# Patient Record
Sex: MALE | Race: WHITE | NOT HISPANIC OR LATINO | Employment: OTHER | URBAN - NONMETROPOLITAN AREA
[De-identification: names, ages, dates, MRNs, and addresses within clinical notes are randomized per-mention and may not be internally consistent; named-entity substitution may affect disease eponyms.]

---

## 2018-12-10 ENCOUNTER — OFFICE VISIT (OUTPATIENT)
Dept: CARDIOLOGY | Facility: CLINIC | Age: 63
End: 2018-12-10

## 2018-12-10 ENCOUNTER — HOSPITAL ENCOUNTER (OUTPATIENT)
Dept: CARDIOLOGY | Facility: HOSPITAL | Age: 63
Discharge: HOME | End: 2018-12-10
Admitting: PHYSICIAN ASSISTANT

## 2018-12-10 VITALS
OXYGEN SATURATION: 96 % | HEIGHT: 69 IN | WEIGHT: 280.2 LBS | DIASTOLIC BLOOD PRESSURE: 89 MMHG | SYSTOLIC BLOOD PRESSURE: 150 MMHG | BODY MASS INDEX: 41.5 KG/M2 | HEART RATE: 67 BPM

## 2018-12-10 DIAGNOSIS — R06.02 SOB (SHORTNESS OF BREATH): ICD-10-CM

## 2018-12-10 DIAGNOSIS — R06.02 SOB (SHORTNESS OF BREATH): Primary | ICD-10-CM

## 2018-12-10 DIAGNOSIS — Z01.810 PRE-OPERATIVE CARDIOVASCULAR EXAMINATION: ICD-10-CM

## 2018-12-10 DIAGNOSIS — I10 ESSENTIAL HYPERTENSION: ICD-10-CM

## 2018-12-10 PROCEDURE — 99204 OFFICE O/P NEW MOD 45 MIN: CPT | Performed by: PHYSICIAN ASSISTANT

## 2018-12-10 PROCEDURE — 93306 TTE W/DOPPLER COMPLETE: CPT | Performed by: INTERNAL MEDICINE

## 2018-12-10 PROCEDURE — 93306 TTE W/DOPPLER COMPLETE: CPT

## 2018-12-10 RX ORDER — FAMOTIDINE 20 MG/1
20 TABLET, FILM COATED ORAL NIGHTLY PRN
Refills: 5 | Status: ON HOLD | COMMUNITY
Start: 2018-10-01 | End: 2019-03-12

## 2018-12-10 RX ORDER — MELOXICAM 7.5 MG/1
TABLET ORAL
Status: ON HOLD | COMMUNITY
End: 2019-03-12

## 2018-12-10 RX ORDER — LISINOPRIL 40 MG/1
40 TABLET ORAL DAILY
Refills: 5 | COMMUNITY
Start: 2018-12-03

## 2018-12-10 RX ORDER — HYDROCODONE BITARTRATE AND ACETAMINOPHEN 7.5; 325 MG/1; MG/1
TABLET ORAL
COMMUNITY

## 2018-12-10 NOTE — PROGRESS NOTES
Felix Foss is a 63 y.o. male     Chief Complaint   Patient presents with   • Surgical Clearance     knee replacement in [facility]  The patient presents today for initial evaluation.  He presents in the preoperative setting.  He is pending knee replacement.  EKG in the preop setting apparently suggested possible ventricular hypertrophy, by patient report.  I have reviewed EKG and see no significant enlargement or findings to suggest hypertrophy.  He had sinus rhythm with no acute changes noted.  The patient's previous cardiac history includes catheterization 3-4 years ago.  He has had no cardiovascular history otherwise.  Symptomatically, the patient seems to do well.  He has dyspnea which is his baseline.  He denies chest pain.  He has no PND orthopnea.  He denies palpitations, dizziness, or syncope.  Exercise capacity is poor secondary to his knee.  He does tell me that labs historically have all been unremarkable.  He has no further complaints otherwise.  We have been asked to see the patient and consider echocardiogram in the preop setting.      Current Outpatient Medications   Medication Sig Dispense Refill   • famotidine (PEPCID) 20 MG tablet Take 20 mg by mouth At Night As Needed.  5   • HYDROcodone-acetaminophen (NORCO) 7.5-325 MG per tablet hydrocodone 7.5 mg-acetaminophen 325 mg tablet     • lisinopril (PRINIVIL,ZESTRIL) 40 MG tablet Take 40 mg by mouth Daily.  5   • meloxicam (MOBIC) 7.5 MG tablet meloxicam 7.5 mg tablet       No current facility-administered medications for this visit.        Patient has no known allergies.    Past Medical History:   Diagnosis Date   • Arthritis    • GERD (gastroesophageal reflux disease)    • Hypertension        Social History     Socioeconomic History   • Marital status:      Spouse name: Not on file   • Number of children: Not on file   • Years of education: Not on file   • Highest education level: Not on file   Social Needs   •  "Financial resource strain: Not on file   • Food insecurity - worry: Not on file   • Food insecurity - inability: Not on file   • Transportation needs - medical: Not on file   • Transportation needs - non-medical: Not on file   Occupational History   • Not on file   Tobacco Use   • Smoking status: Never Smoker   • Smokeless tobacco: Former User   Substance and Sexual Activity   • Alcohol use: No     Frequency: Never   • Drug use: No   • Sexual activity: Not on file   Other Topics Concern   • Not on file   Social History Narrative   • Not on file           Family History   Problem Relation Age of Onset   • No Known Problems Mother    • No Known Problems Father        Review of Systems   Constitutional: Positive for fatigue.   HENT: Negative.    Eyes: Positive for visual disturbance ( wears reading glasses ).   Respiratory: Negative for shortness of breath.    Cardiovascular: Positive for palpitations. Negative for chest pain and leg swelling.   Gastrointestinal: Negative for constipation and diarrhea.   Endocrine: Negative.    Genitourinary: Negative for difficulty urinating.   Musculoskeletal: Positive for arthralgias ( ) and myalgias.   Skin: Negative.    Allergic/Immunologic: Negative for environmental allergies and food allergies.   Neurological: Negative for dizziness and light-headedness.   Hematological: Does not bruise/bleed easily.   Psychiatric/Behavioral: The patient is nervous/anxious.        Objective   Vitals:    12/10/18 1322   BP: 150/89   BP Location: Left arm   Patient Position: Sitting   Pulse: 67   SpO2: 96%   Weight: 127 kg (280 lb 3.2 oz)   Height: 175.3 cm (69\")      /89 (BP Location: Left arm, Patient Position: Sitting)   Pulse 67   Ht 175.3 cm (69\")   Wt 127 kg (280 lb 3.2 oz)   SpO2 96%   BMI 41.38 kg/m²     Lab Results (most recent)     None          Physical Exam   Constitutional: He is oriented to person, place, and time. He appears well-developed and well-nourished. No " distress.   HENT:   Head: Normocephalic and atraumatic.   Eyes: Conjunctivae and EOM are normal. Pupils are equal, round, and reactive to light.   Neck: Normal range of motion. Neck supple. No JVD present. No tracheal deviation present.   Cardiovascular: Normal rate, regular rhythm, normal heart sounds and intact distal pulses.   Pulmonary/Chest: Effort normal and breath sounds normal.   Abdominal: Soft. Bowel sounds are normal. He exhibits no distension and no mass. There is no tenderness. There is no rebound and no guarding.   Musculoskeletal: Normal range of motion. He exhibits no edema, tenderness or deformity.   Neurological: He is alert and oriented to person, place, and time.   Skin: Skin is warm and dry. No rash noted. No erythema. No pallor.   Psychiatric: He has a normal mood and affect. His behavior is normal. Judgment and thought content normal.   Nursing note and vitals reviewed.      Procedure   Procedures         Assessment/Plan     Problems Addressed this Visit     None      Visit Diagnoses     SOB (shortness of breath)    -  Primary    Relevant Orders    Adult Transthoracic Echo Complete W/ Cont if Necessary Per Protocol    Pre-operative cardiovascular examination        Essential hypertension        Relevant Medications    lisinopril (PRINIVIL,ZESTRIL) 40 MG tablet            I would like to schedule an echo to evaluate LV size and function, as well as valvular morphologies in the preop setting.  I do not feel that he needs ischemia assessment preoperatively.  We will try to schedule the echo today through the clinic.  Otherwise, the patient is on appropriate medications which I will not change.  We did give him refills of lisinopril therapy.  If echocardiogram is unremarkable, we will send a letter on to his surgeon advising that he is at acceptable risk from cardiovascular standpoint for surgery.  If unremarkable, we can see him as needed to the clinic.                Patient's Body mass index is  41.38 kg/m². BMI is above normal parameters. Recommendations include: educational material.         Electronically signed by:

## 2018-12-10 NOTE — PATIENT INSTRUCTIONS
Obesity, Adult  Obesity is the condition of having too much total body fat. Being overweight or obese means that your weight is greater than what is considered healthy for your body size. Obesity is determined by a measurement called BMI. BMI is an estimate of body fat and is calculated from height and weight. For adults, a BMI of 30 or higher is considered obese.  Obesity can eventually lead to other health concerns and major illnesses, including:  · Stroke.  · Coronary artery disease (CAD).  · Type 2 diabetes.  · Some types of cancer, including cancers of the colon, breast, uterus, and gallbladder.  · Osteoarthritis.  · High blood pressure (hypertension).  · High cholesterol.  · Sleep apnea.  · Gallbladder stones.  · Infertility problems.    What are the causes?  The main cause of obesity is taking in (consuming) more calories than your body uses for energy. Other factors that contribute to this condition may include:  · Being born with genes that make you more likely to become obese.  · Having a medical condition that causes obesity. These conditions include:  ? Hypothyroidism.  ? Polycystic ovarian syndrome (PCOS).  ? Binge-eating disorder.  ? Cushing syndrome.  · Taking certain medicines, such as steroids, antidepressants, and seizure medicines.  · Not being physically active (sedentary lifestyle).  · Living where there are limited places to exercise safely or buy healthy foods.  · Not getting enough sleep.    What increases the risk?  The following factors may increase your risk of this condition:  · Having a family history of obesity.  · Being a woman of -American descent.  · Being a man of  descent.    What are the signs or symptoms?  Having excessive body fat is the main symptom of this condition.  How is this diagnosed?  This condition may be diagnosed based on:  · Your symptoms.  · Your medical history.  · A physical exam. Your health care provider may measure:  ? Your BMI. If you are an  adult with a BMI between 25 and less than 30, you are considered overweight. If you are an adult with a BMI of 30 or higher, you are considered obese.  ? The distances around your hips and your waist (circumferences). These may be compared to each other to help diagnose your condition.  ? Your skinfold thickness. Your health care provider may gently pinch a fold of your skin and measure it.    How is this treated?  Treatment for this condition often includes changing your lifestyle. Treatment may include some or all of the following:  · Dietary changes. Work with your health care provider and a dietitian to set a weight-loss goal that is healthy and reasonable for you. Dietary changes may include eating:  ? Smaller portions. A portion size is the amount of a particular food that is healthy for you to eat at one time. This varies from person to person.  ? Low-calorie or low-fat options.  ? More whole grains, fruits, and vegetables.  · Regular physical activity. This may include aerobic activity (cardio) and strength training.  · Medicine to help you lose weight. Your health care provider may prescribe medicine if you are unable to lose 1 pound a week after 6 weeks of eating more healthily and doing more physical activity.  · Surgery. Surgical options may include gastric banding and gastric bypass. Surgery may be done if:  ? Other treatments have not helped to improve your condition.  ? You have a BMI of 40 or higher.  ? You have life-threatening health problems related to obesity.    Follow these instructions at home:    Eating and drinking    · Follow recommendations from your health care provider about what you eat and drink. Your health care provider may advise you to:  ? Limit fast foods, sweets, and processed snack foods.  ? Choose low-fat options, such as low-fat milk instead of whole milk.  ? Eat 5 or more servings of fruits or vegetables every day.  ? Eat at home more often. This gives you more control over  what you eat.  ? Choose healthy foods when you eat out.  ? Learn what a healthy portion size is.  ? Keep low-fat snacks on hand.  ? Avoid sugary drinks, such as soda, fruit juice, iced tea sweetened with sugar, and flavored milk.  ? Eat a healthy breakfast.  · Drink enough water to keep your urine clear or pale yellow.  · Do not go without eating for long periods of time (do not fast) or follow a fad diet. Fasting and fad diets can be unhealthy and even dangerous.  Physical Activity  · Exercise regularly, as told by your health care provider. Ask your health care provider what types of exercise are safe for you and how often you should exercise.  · Warm up and stretch before being active.  · Cool down and stretch after being active.  · Rest between periods of activity.  Lifestyle  · Limit the time that you spend in front of your TV, computer, or video game system.  · Find ways to reward yourself that do not involve food.  · Limit alcohol intake to no more than 1 drink a day for nonpregnant women and 2 drinks a day for men. One drink equals 12 oz of beer, 5 oz of wine, or 1½ oz of hard liquor.  General instructions  · Keep a weight loss journal to keep track of the food you eat and how much you exercise you get.  · Take over-the-counter and prescription medicines only as told by your health care provider.  · Take vitamins and supplements only as told by your health care provider.  · Consider joining a support group. Your health care provider may be able to recommend a support group.  · Keep all follow-up visits as told by your health care provider. This is important.  Contact a health care provider if:  · You are unable to meet your weight loss goal after 6 weeks of dietary and lifestyle changes.  This information is not intended to replace advice given to you by your health care provider. Make sure you discuss any questions you have with your health care provider.  Document Released: 01/25/2006 Document Revised:  05/22/2017 Document Reviewed: 10/05/2016  [employer] Interactive Patient Education © 2018 Elsevier Inc.  MyPlate from [employer]  The general, healthful diet is based on the 2010 Dietary Guidelines for Americans. The amount of food you need to eat from each food group depends on your age, sex, and level of physical activity and can be individualized by a dietitian. Go to ChooseMyPlate.gov for more information.  What do I need to know about the MyPlate plan?  · Enjoy your food, but eat less.  · Avoid oversized portions.  ? ½ of your plate should include fruits and vegetables.  ? ¼ of your plate should be grains.  ? ¼ of your plate should be protein.  Grains  · Make at least half of your grains whole grains.  · For a 2,000 calorie daily food plan, eat 6 oz every day.  · 1 oz is about 1 slice bread, 1 cup cereal, or ½ cup cooked rice, cereal, or pasta.  Vegetables  · Make half your plate fruits and vegetables.  · For a 2,000 calorie daily food plan, eat 2½ cups every day.  · 1 cup is about 1 cup raw or cooked vegetables or vegetable juice or 2 cups raw leafy greens.  Fruits  · Make half your plate fruits and vegetables.  · For a 2,000 calorie daily food plan, eat 2 cups every day.  · 1 cup is about 1 cup fruit or 100% fruit juice or ½ cup dried fruit.  Protein  · For a 2,000 calorie daily food plan, eat 5½ oz every day.  · 1 oz is about 1 oz meat, poultry, or fish, ¼ cup cooked beans, 1 egg, 1 Tbsp peanut butter, or ½ oz nuts or seeds.  Dairy  · Switch to fat-free or low-fat (1%) milk.  · For a 2,000 calorie daily food plan, eat 3 cups every day.  · 1 cup is about 1 cup milk or yogurt or soy milk (soy beverage), 1½ oz natural cheese, or 2 oz processed cheese.  Fats, Oils, and Empty Calories  · Only small amounts of oils are recommended.  · Empty calories are calories from solid fats or added sugars.  · Compare sodium in foods like soup, bread, and frozen meals. Choose the foods with lower numbers.  · Drink water instead of  sugary drinks.  What foods can I eat?  Grains  Whole grains such as whole wheat, quinoa, millet, and bulgur. Bread, rolls, and pasta made from whole grains. Brown or wild rice. Hot or cold cereals made from whole grains and without added sugar.  Vegetables  All fresh vegetables, especially fresh red, dark green, or orange vegetables. Peas and beans. Low-sodium frozen or canned vegetables prepared without added salt. Low-sodium vegetable juices.  Fruits  All fresh, frozen, and dried fruits. Canned fruit packed in water or fruit juice without added sugar. Fruit juices without added sugar.  Meats and Other Protein Sources  Boiled, baked, or grilled lean meat trimmed of fat. Skinless poultry. Fresh seafood and shellfish. Canned seafood packed in water. Unsalted nuts and unsalted nut butters. Tofu. Dried beans and pea. Eggs.  Dairy  Low-fat or fat-free milk, yogurt, and cheeses.  Sweets and Desserts  Frozen desserts made from low-fat milk.  Fats and Oils  Olive, peanut, and canola oils and margarine. Salad dressing and mayonnaise made from these oils.  Other  Soups and casseroles made from allowed ingredients and without added fat or salt.  The items listed above may not be a complete list of recommended foods or beverages. Contact your dietitian for more options.  What foods are not recommended?  Grains  Sweetened, low-fiber cereals. Packaged baked goods. Snack crackers and chips. Cheese crackers, butter crackers, and biscuits. Frozen waffles, sweet breads, doughnuts, pastries, packaged baking mixes, pancakes, cakes, and cookies.  Vegetables  Regular canned or frozen vegetables or vegetables prepared with salt. Canned tomatoes. Canned tomato sauce. Fried vegetables. Vegetables in cream sauce or cheese sauce.  Fruits  Fruits packed in syrup or made with added sugar.  Meats and Other Protein Sources  Marbled or fatty meats such as ribs. Poultry with skin. Fried meats, poultry, eggs, or fish. Sausages, hot dogs, and deli  meats such as pastrami, bologna, or salami.  Dairy  Whole milk, cream, cheeses made from whole milk, sour cream. Ice cream or yogurt made from whole milk or with added sugar.  Beverages  For adults, no more than one alcoholic drink per day. Regular soft drinks or other sugary beverages. Juice drinks.  Sweets and Desserts  Sugary or fatty desserts, candy, and other sweets.  Fats and Oils  Solid shortening or partially hydrogenated oils. Solid margarine. Margarine that contains trans fats. Butter.  The items listed above may not be a complete list of foods and beverages to avoid. Contact your dietitian for more information.  This information is not intended to replace advice given to you by your health care provider. Make sure you discuss any questions you have with your health care provider.  Document Released: 01/06/2009 Document Revised: 05/25/2017 Document Reviewed: 11/26/2014  [employer] Interactive Patient Education © 2018 Elsevier Inc.

## 2018-12-11 ENCOUNTER — TELEPHONE (OUTPATIENT)
Dept: CARDIOLOGY | Facility: CLINIC | Age: 63
End: 2018-12-11

## 2018-12-11 NOTE — TELEPHONE ENCOUNTER
----- Message from Maru Ortiz MA sent at 12/10/2018  1:28 PM EST -----  Regarding: Cardiac Clearance   Cardiac clearance for knee replacement with Dr. Joseph Strange. Number is [id number]

## 2018-12-11 NOTE — TELEPHONE ENCOUNTER
12/10/2018: called @ 0544 PM, office closed . Need cardiac clearance req faxed -;[facility]  12/11/2018: called @ 0358 PM and left VM for Bridget requesting her to fax cardiac clearance req . -;[facility]

## 2018-12-12 LAB
BH CV ECHO MEAS - ACS: 2.6 CM
BH CV ECHO MEAS - AI DEC SLOPE: 179.4 CM/SEC^2
BH CV ECHO MEAS - AI DEC TIME: 1.9 SEC
BH CV ECHO MEAS - AI MAX PG: 46.9 MMHG
BH CV ECHO MEAS - AI MAX VEL: 342.5 CM/SEC
BH CV ECHO MEAS - AI P1/2T: 559.1 MSEC
BH CV ECHO MEAS - AO MAX PG: 6.4 MMHG
BH CV ECHO MEAS - AO MEAN PG: 3.6 MMHG
BH CV ECHO MEAS - AO ROOT AREA (BSA CORRECTED): 1.7
BH CV ECHO MEAS - AO ROOT AREA: 13 CM^2
BH CV ECHO MEAS - AO ROOT DIAM: 4.1 CM
BH CV ECHO MEAS - AO V2 MAX: 126.3 CM/SEC
BH CV ECHO MEAS - AO V2 MEAN: 87.4 CM/SEC
BH CV ECHO MEAS - AO V2 VTI: 29.9 CM
BH CV ECHO MEAS - BSA(HAYCOCK): 2.5 M^2
BH CV ECHO MEAS - BSA: 2.4 M^2
BH CV ECHO MEAS - BZI_BMI: 41.3 KILOGRAMS/M^2
BH CV ECHO MEAS - BZI_METRIC_HEIGHT: 175.3 CM
BH CV ECHO MEAS - BZI_METRIC_WEIGHT: 127 KG
BH CV ECHO MEAS - EDV(CUBED): 178.9 ML
BH CV ECHO MEAS - EDV(TEICH): 155.9 ML
BH CV ECHO MEAS - EF(CUBED): 71.1 %
BH CV ECHO MEAS - EF(TEICH): 62.1 %
BH CV ECHO MEAS - ESV(CUBED): 51.8 ML
BH CV ECHO MEAS - ESV(TEICH): 59.2 ML
BH CV ECHO MEAS - FS: 33.9 %
BH CV ECHO MEAS - IVS/LVPW: 1.3
BH CV ECHO MEAS - IVSD: 1.4 CM
BH CV ECHO MEAS - LA DIMENSION: 4.9 CM
BH CV ECHO MEAS - LA/AO: 1.2
BH CV ECHO MEAS - LV IVRT: 0.17 SEC
BH CV ECHO MEAS - LV MASS(C)D: 298.2 GRAMS
BH CV ECHO MEAS - LV MASS(C)DI: 125.1 GRAMS/M^2
BH CV ECHO MEAS - LVIDD: 5.6 CM
BH CV ECHO MEAS - LVIDS: 3.7 CM
BH CV ECHO MEAS - LVPWD: 1.1 CM
BH CV ECHO MEAS - MITRAL HR: 92.7 BPM
BH CV ECHO MEAS - MITRAL R-R: 0.65 SEC
BH CV ECHO MEAS - MV A MAX VEL: 79 CM/SEC
BH CV ECHO MEAS - MV DEC SLOPE: 286.7 CM/SEC^2
BH CV ECHO MEAS - MV DEC TIME: 0.23 SEC
BH CV ECHO MEAS - MV E MAX VEL: 65.6 CM/SEC
BH CV ECHO MEAS - MV E/A: 0.83
BH CV ECHO MEAS - MV MAX PG: 2.7 MMHG
BH CV ECHO MEAS - MV MEAN PG: 1 MMHG
BH CV ECHO MEAS - MV V2 MAX: 82.1 CM/SEC
BH CV ECHO MEAS - MV V2 MEAN: 46.2 CM/SEC
BH CV ECHO MEAS - MV V2 VTI: 29.9 CM
BH CV ECHO MEAS - PA MAX PG: 5 MMHG
BH CV ECHO MEAS - PA MEAN PG: 2.6 MMHG
BH CV ECHO MEAS - PA V2 MAX: 111.3 CM/SEC
BH CV ECHO MEAS - PA V2 MEAN: 75.3 CM/SEC
BH CV ECHO MEAS - PA V2 VTI: 23.2 CM
BH CV ECHO MEAS - PI END-D VEL: 89.9 CM/SEC
BH CV ECHO MEAS - PULM. HR: 194.8 BPM
BH CV ECHO MEAS - PULM. R-R: 0.31 SEC
BH CV ECHO MEAS - RAP SYSTOLE: 10 MMHG
BH CV ECHO MEAS - RVDD: 3.4 CM
BH CV ECHO MEAS - RVSP: 26.2 MMHG
BH CV ECHO MEAS - SI(AO): 163.4 ML/M^2
BH CV ECHO MEAS - SI(CUBED): 53.3 ML/M^2
BH CV ECHO MEAS - SI(TEICH): 40.6 ML/M^2
BH CV ECHO MEAS - SV(AO): 389.5 ML
BH CV ECHO MEAS - SV(CUBED): 127.1 ML
BH CV ECHO MEAS - SV(TEICH): 96.7 ML
BH CV ECHO MEAS - TR MAX VEL: 201 CM/SEC
MAXIMAL PREDICTED HEART RATE: 158 BPM
STRESS TARGET HR: 134 BPM

## 2018-12-26 NOTE — TELEPHONE ENCOUNTER
Cardiac clearance req was received in office from . This was reviewed by Bryant Billings PA-C and [facility] back. [facility]

## 2019-01-29 ENCOUNTER — OFFICE VISIT (OUTPATIENT)
Dept: CARDIAC SURGERY | Facility: CLINIC | Age: 64
End: 2019-01-29

## 2019-01-29 VITALS
HEIGHT: 69 IN | HEART RATE: 74 BPM | BODY MASS INDEX: 39.69 KG/M2 | SYSTOLIC BLOOD PRESSURE: 148 MMHG | OXYGEN SATURATION: 97 % | TEMPERATURE: 98 F | WEIGHT: 268 LBS | DIASTOLIC BLOOD PRESSURE: 95 MMHG

## 2019-01-29 DIAGNOSIS — I71.21 ASCENDING AORTIC ANEURYSM (HCC): Primary | ICD-10-CM

## 2019-01-29 DIAGNOSIS — Z00.6 EXAMINATION FOR NORMAL COMPARISON FOR CLINICAL RESEARCH: Primary | ICD-10-CM

## 2019-01-29 PROBLEM — Z82.49 FAMILY HISTORY OF ANEURYSM: Status: ACTIVE | Noted: 2019-01-29

## 2019-01-29 PROCEDURE — 99203 OFFICE O/P NEW LOW 30 MIN: CPT | Performed by: THORACIC SURGERY (CARDIOTHORACIC VASCULAR SURGERY)

## 2019-01-29 RX ORDER — FERROUS SULFATE TAB EC 324 MG (65 MG FE EQUIVALENT) 324 (65 FE) MG
324 TABLET DELAYED RESPONSE ORAL 2 TIMES DAILY
Refills: 2 | Status: ON HOLD | COMMUNITY
Start: 2019-01-24 | End: 2019-03-12

## 2019-02-08 ENCOUNTER — TELEPHONE (OUTPATIENT)
Dept: GENETICS | Facility: HOSPITAL | Age: 64
End: 2019-02-08

## 2019-02-15 ENCOUNTER — TELEPHONE (OUTPATIENT)
Dept: CARDIAC SURGERY | Facility: CLINIC | Age: 64
End: 2019-02-15

## 2019-02-15 NOTE — TELEPHONE ENCOUNTER
Wife called to let us know that he went to [facility] ER last week with sharp pains between his shoulder blades, chest pain and pressure.  Says ER did EKG, CXR, and some blood work, and nothing was abnormal.  His BP was high, and primary care doctor added a new blood pressure medicine.  Advised her not sure of anything else we could do, but would pass info along to you.  Please Advise if we need to do anything differently that what's already in the plan.

## 2019-02-19 ENCOUNTER — TELEPHONE (OUTPATIENT)
Dept: GENETICS | Facility: HOSPITAL | Age: 64
End: 2019-02-19

## 2019-02-19 DIAGNOSIS — I71.21 ASCENDING AORTIC ANEURYSM (HCC): Primary | ICD-10-CM

## 2019-02-19 NOTE — TELEPHONE ENCOUNTER
Patients spouse called concerned about insurance not covering counseling portion of visit. I discussed with Kip Saravia,  of Cancer Services regarding this concern.  Kip, stated the [employer] would be able to cover the portion of the counseling piece. I called Ms. Foss and let her know that we would like for Mr. Foss to keep his genetic appt since Dr. Evans felt the need for the testing and that [facility] would assist with the uncovered portion.

## 2019-02-25 ENCOUNTER — CLINICAL SUPPORT (OUTPATIENT)
Dept: GENETICS | Facility: HOSPITAL | Age: 64
End: 2019-02-25

## 2019-02-25 DIAGNOSIS — Z82.49 FAMILY HISTORY OF AORTIC ANEURYSM: ICD-10-CM

## 2019-02-25 DIAGNOSIS — I71.9 AORTIC ANEURYSM WITHOUT RUPTURE, UNSPECIFIED PORTION OF AORTA (HCC): Primary | ICD-10-CM

## 2019-03-11 ENCOUNTER — TRANSCRIBE ORDERS (OUTPATIENT)
Dept: ADMINISTRATIVE | Facility: HOSPITAL | Age: 64
End: 2019-03-11

## 2019-03-11 DIAGNOSIS — I20.0 UNSTABLE ANGINA (HCC): Primary | ICD-10-CM

## 2019-03-12 ENCOUNTER — HOSPITAL ENCOUNTER (OUTPATIENT)
Facility: HOSPITAL | Age: 64
Setting detail: HOSPITAL OUTPATIENT SURGERY
Discharge: HOME | End: 2019-03-12
Attending: INTERNAL MEDICINE | Admitting: INTERNAL MEDICINE

## 2019-03-12 VITALS
BODY MASS INDEX: 36.38 KG/M2 | WEIGHT: 245.59 LBS | TEMPERATURE: 97.7 F | SYSTOLIC BLOOD PRESSURE: 122 MMHG | HEART RATE: 73 BPM | OXYGEN SATURATION: 95 % | DIASTOLIC BLOOD PRESSURE: 92 MMHG | RESPIRATION RATE: 18 BRPM | HEIGHT: 69 IN

## 2019-03-12 DIAGNOSIS — I20.0 UNSTABLE ANGINA (HCC): ICD-10-CM

## 2019-03-12 LAB
ANION GAP SERPL CALCULATED.3IONS-SCNC: 8 MMOL/L (ref 3–11)
BUN BLD-MCNC: 12 MG/DL (ref 9–23)
BUN/CREAT SERPL: 15.4 (ref 7–25)
CALCIUM SPEC-SCNC: 9.5 MG/DL (ref 8.7–10.4)
CHLORIDE SERPL-SCNC: 104 MMOL/L (ref 99–109)
CO2 SERPL-SCNC: 25 MMOL/L (ref 20–31)
CREAT BLD-MCNC: 0.78 MG/DL (ref 0.6–1.3)
DEPRECATED RDW RBC AUTO: 48.3 FL (ref 37–54)
ERYTHROCYTE [DISTWIDTH] IN BLOOD BY AUTOMATED COUNT: 13.5 % (ref 11.3–14.5)
GFR SERPL CREATININE-BSD FRML MDRD: 101 ML/MIN/1.73
GLUCOSE BLD-MCNC: 102 MG/DL (ref 70–100)
HCT VFR BLD AUTO: 42.5 % (ref 38.9–50.9)
HGB BLD-MCNC: 13.9 G/DL (ref 13.1–17.5)
MCH RBC QN AUTO: 31.7 PG (ref 27–31)
MCHC RBC AUTO-ENTMCNC: 32.7 G/DL (ref 32–36)
MCV RBC AUTO: 97 FL (ref 80–99)
PLATELET # BLD AUTO: 368 10*3/MM3 (ref 150–450)
PMV BLD AUTO: 10.2 FL (ref 6–12)
POTASSIUM BLD-SCNC: 3.8 MMOL/L (ref 3.5–5.5)
RBC # BLD AUTO: 4.38 10*6/MM3 (ref 4.2–5.76)
SODIUM BLD-SCNC: 137 MMOL/L (ref 132–146)
WBC NRBC COR # BLD: 9.23 10*3/MM3 (ref 3.5–10.8)

## 2019-03-12 PROCEDURE — C1887 CATHETER, GUIDING: HCPCS | Performed by: INTERNAL MEDICINE

## 2019-03-12 PROCEDURE — C1769 GUIDE WIRE: HCPCS | Performed by: INTERNAL MEDICINE

## 2019-03-12 PROCEDURE — 85027 COMPLETE CBC AUTOMATED: CPT | Performed by: INTERNAL MEDICINE

## 2019-03-12 PROCEDURE — 25010000002 HEPARIN (PORCINE) PER 1000 UNITS: Performed by: INTERNAL MEDICINE

## 2019-03-12 PROCEDURE — 80048 BASIC METABOLIC PNL TOTAL CA: CPT | Performed by: INTERNAL MEDICINE

## 2019-03-12 PROCEDURE — 93458 L HRT ARTERY/VENTRICLE ANGIO: CPT | Performed by: INTERNAL MEDICINE

## 2019-03-12 PROCEDURE — 36415 COLL VENOUS BLD VENIPUNCTURE: CPT

## 2019-03-12 PROCEDURE — 25010000002 FENTANYL CITRATE (PF) 100 MCG/2ML SOLUTION: Performed by: INTERNAL MEDICINE

## 2019-03-12 PROCEDURE — 25010000002 MIDAZOLAM PER 1 MG: Performed by: INTERNAL MEDICINE

## 2019-03-12 PROCEDURE — 25010000002 BIVALIRUDIN 5 MG/ML: Performed by: INTERNAL MEDICINE

## 2019-03-12 PROCEDURE — 0 IOPAMIDOL PER 1 ML: Performed by: INTERNAL MEDICINE

## 2019-03-12 PROCEDURE — C1874 STENT, COATED/COV W/DEL SYS: HCPCS | Performed by: INTERNAL MEDICINE

## 2019-03-12 PROCEDURE — C1894 INTRO/SHEATH, NON-LASER: HCPCS | Performed by: INTERNAL MEDICINE

## 2019-03-12 PROCEDURE — C9600 PERC DRUG-EL COR STENT SING: HCPCS | Performed by: INTERNAL MEDICINE

## 2019-03-12 DEVICE — XIENCE SIERRA™ EVEROLIMUS ELUTING CORONARY STENT SYSTEM 4.00 MM X 23 MM / RAPID-EXCHANGE
Type: IMPLANTABLE DEVICE | Status: FUNCTIONAL
Brand: XIENCE SIERRA™

## 2019-03-12 DEVICE — XIENCE SIERRA™ EVEROLIMUS ELUTING CORONARY STENT SYSTEM 4.00 MM X 15 MM / RAPID-EXCHANGE
Type: IMPLANTABLE DEVICE | Status: FUNCTIONAL
Brand: XIENCE SIERRA™

## 2019-03-12 RX ORDER — MELOXICAM 15 MG/1
15 TABLET ORAL DAILY
COMMUNITY

## 2019-03-12 RX ORDER — ACETAMINOPHEN 325 MG/1
650 TABLET ORAL EVERY 4 HOURS PRN
Status: DISCONTINUED | OUTPATIENT
Start: 2019-03-12 | End: 2019-03-12 | Stop reason: HOSPADM

## 2019-03-12 RX ORDER — MIDAZOLAM HYDROCHLORIDE 1 MG/ML
INJECTION INTRAMUSCULAR; INTRAVENOUS AS NEEDED
Status: DISCONTINUED | OUTPATIENT
Start: 2019-03-12 | End: 2019-03-12 | Stop reason: HOSPADM

## 2019-03-12 RX ORDER — TEMAZEPAM 7.5 MG/1
7.5 CAPSULE ORAL NIGHTLY PRN
Status: DISCONTINUED | OUTPATIENT
Start: 2019-03-12 | End: 2019-03-12 | Stop reason: HOSPADM

## 2019-03-12 RX ORDER — FENTANYL CITRATE 50 UG/ML
INJECTION, SOLUTION INTRAMUSCULAR; INTRAVENOUS AS NEEDED
Status: DISCONTINUED | OUTPATIENT
Start: 2019-03-12 | End: 2019-03-12 | Stop reason: HOSPADM

## 2019-03-12 RX ORDER — ASPIRIN 325 MG
325 TABLET, DELAYED RELEASE (ENTERIC COATED) ORAL DAILY
Status: DISCONTINUED | OUTPATIENT
Start: 2019-03-12 | End: 2019-03-12 | Stop reason: HOSPADM

## 2019-03-12 RX ORDER — ZOLPIDEM TARTRATE 10 MG/1
10 TABLET ORAL NIGHTLY PRN
COMMUNITY

## 2019-03-12 RX ORDER — LIDOCAINE HYDROCHLORIDE 10 MG/ML
INJECTION, SOLUTION EPIDURAL; INFILTRATION; INTRACAUDAL; PERINEURAL AS NEEDED
Status: DISCONTINUED | OUTPATIENT
Start: 2019-03-12 | End: 2019-03-12 | Stop reason: HOSPADM

## 2019-03-12 RX ORDER — ALPRAZOLAM 0.25 MG/1
0.25 TABLET ORAL 3 TIMES DAILY PRN
Status: DISCONTINUED | OUTPATIENT
Start: 2019-03-12 | End: 2019-03-12 | Stop reason: HOSPADM

## 2019-03-12 RX ORDER — NALOXONE HCL 0.4 MG/ML
0.4 VIAL (ML) INJECTION
Status: DISCONTINUED | OUTPATIENT
Start: 2019-03-12 | End: 2019-03-12 | Stop reason: HOSPADM

## 2019-03-12 RX ORDER — HYDROCODONE BITARTRATE AND ACETAMINOPHEN 5; 325 MG/1; MG/1
1 TABLET ORAL EVERY 4 HOURS PRN
Status: DISCONTINUED | OUTPATIENT
Start: 2019-03-12 | End: 2019-03-12 | Stop reason: HOSPADM

## 2019-03-12 RX ORDER — SODIUM CHLORIDE 9 MG/ML
250 INJECTION, SOLUTION INTRAVENOUS CONTINUOUS
Status: ACTIVE | OUTPATIENT
Start: 2019-03-12 | End: 2019-03-12

## 2019-03-12 RX ORDER — CLOPIDOGREL BISULFATE 75 MG/1
75 TABLET ORAL DAILY
Qty: 90 TABLET | Refills: 3 | Status: SHIPPED | OUTPATIENT
Start: 2019-03-12

## 2019-03-12 RX ORDER — PANTOPRAZOLE SODIUM 40 MG/1
40 TABLET, DELAYED RELEASE ORAL DAILY
COMMUNITY

## 2019-03-12 RX ORDER — MORPHINE SULFATE 2 MG/ML
1 INJECTION, SOLUTION INTRAMUSCULAR; INTRAVENOUS EVERY 4 HOURS PRN
Status: DISCONTINUED | OUTPATIENT
Start: 2019-03-12 | End: 2019-03-12 | Stop reason: HOSPADM

## 2019-03-12 RX ORDER — CLOPIDOGREL BISULFATE 75 MG/1
TABLET ORAL AS NEEDED
Status: DISCONTINUED | OUTPATIENT
Start: 2019-03-12 | End: 2019-03-12 | Stop reason: HOSPADM

## 2019-03-12 RX ORDER — AMLODIPINE BESYLATE 2.5 MG/1
2.5 TABLET ORAL DAILY
COMMUNITY

## 2019-03-12 RX ADMIN — ASPIRIN 325 MG: 325 TABLET, COATED ORAL at 11:00

## 2019-03-12 NOTE — H&P
Pre-Cardiac Catheterization Report  Cardiovascular Laboratory  [facility]      Patient:  Aristeo Foss  :  1955  PCP:  Danielle Lagos MD  PHONE:  [phone]    DATE: 3/12/2019    BRIEF HPI:  Aristeo Foss is a 64 y.o. male with hypertension and a family history of coronary artery disease.  He was recently diagnosed with an aneurysm and it is being followed by Dr. Evans.  He is complaining of a several month history of chest pain which he describes as pressure.  He states it is associated with shortness of breath, palpitations, and blurred vision.  He radiates to his left arm he says that his symptoms are exacerbated by exertion and relieved with rest.  He now presents for left heart catheterization with possible intervention.    Cardiac Risk Factors:  advanced age (older than 55 for men, 65 for women), family history of premature cardiovascular disease, hypertension, male gender, obesity (BMI >= 30 kg/m2)    Anginal class in last 2 weeks:  CCS class III    CHF Class in last 2 weeks:  NYHA Class II    Cardiogenic shock:  no    Cardiac arrest <24 hours:  no    Stress test within last 6 months:   no   Details:    Previous cardiac catheterization:  yes  Details:     Previous CABG:  no  Details:      Allergies:     IV contrast allergy:  no  No Known Allergies    MEDICATIONS:  Prior to Admission medications    Medication Sig Start Date End Date Taking? Authorizing Provider   Docusate Sodium (DOC-Q-LACE PO) Take 100 mg by mouth 2 (Two) Times a Day.    Franchesca Barrientos MD   famotidine (PEPCID) 20 MG tablet Take 20 mg by mouth At Night As Needed. 10/1/18   Franchesca Barrientos MD   ferrous sulfate 324 (65 Fe) MG tablet delayed-release EC tablet Take 324 mg by mouth 2 (Two) Times a Day. 19   Franchesca Barrientos MD   HYDROcodone-acetaminophen (NORCO) 7.5-325 MG per tablet hydrocodone 7.5 mg-acetaminophen 325 mg tablet    Franchesca Barrientos MD   lisinopril (PRINIVIL,ZESTRIL) 40 MG  tablet Take 40 mg by mouth Daily. 12/3/18   Provider, MD Franchesca   meloxicam (MOBIC) 7.5 MG tablet meloxicam 7.5 mg tablet    Provider, MD Franchesca       Past medical & surgical history, social and family history reviewed in the electronic medical record.    ROS:  Cardiovascular ROS: positive for - chest pain, dyspnea on exertion, palpitations and shortness of breath    Physical Exam:    Vitals: There were no vitals filed for this visit. There were no vitals filed for this visit.    General Appearance:    Alert, cooperative, in no acute distress   Head:    Normocephalic, without obvious abnormality, atraumatic   Eyes:            Lids and lashes normal, conjunctivae and sclerae normal, no   icterus, no pallor, corneas clear, PERRLA   Ears:    Ears appear intact with no abnormalities noted   Neck:   No adenopathy, supple, trachea midline, no thyromegaly, no   carotid bruit, no JVD   Back:     No kyphosis present, no scoliosis present, range of motion normal   Lungs:     Clear to auscultation,respirations regular, even and                  unlabored    Heart:    Regular rhythm and normal rate, normal S1 and S2, no            murmur, no gallop, no rub, no click   Chest Wall:    No abnormalities observed   Abdomen:     Normal bowel sounds, no masses, no organomegaly, soft        non-tender, non-distended, no guarding, no rebound                tenderness   Rectal:     Deferred   Extremities:   Moves all extremities well, no edema, no cyanosis, no             redness   Pulses:   Pulses palpable and equal bilaterally   Skin:   No bleeding, bruising or rash   Neurologic:   Cranial nerves 2 - 12 grossly intact, sensation intact     Barbaeu Test:  Left: Normal  (oxymetric Allens) Right: Not Assessed             No results found for: CHLPL, TRIG, HDL, LDLDIRECT, AST, ALT        Impression      · Unstable angina    Plan     · Procedure to perform: [facility]  · Planned access: Left radial artery              Tonio March,  PA  03/12/19  10:33 AM

## 2019-03-13 ENCOUNTER — DOCUMENTATION (OUTPATIENT)
Dept: CARDIAC REHAB | Facility: HOSPITAL | Age: 64
End: 2019-03-13

## 2019-03-13 ENCOUNTER — CALL CENTER PROGRAMS (OUTPATIENT)
Dept: CALL CENTER | Facility: HOSPITAL | Age: 64
End: 2019-03-13

## 2019-03-13 NOTE — OUTREACH NOTE
PCI Survey      Responses   Facility patient discharged from?  [facility]   Procedure date  03/12/19   PCI site:  Left, Arm   Performing MD  Other (annotate) [Dr Wade]   Attempt successful?  Yes   Call start time  1306   Call end time  1320   Person spoke with today (if not patient) and relationship  Spouse   Is the patient taking prescribed medications:  ASA, Plavix   Nursing intervention  Reminded to continue to take prescribed medications   Nursing intervention  Patient education provided, Advised patient to call cardiology office   Does the patient have an appointment scheduled with the cardiologist?  No   Did the patient feel prepared to go home on the same day as the procedure?  Yes   Is the patient satisfied with the same day discharge process?  Yes   PCI call completed  Yes          Tristan Aleman RN

## 2019-03-13 NOTE — PROGRESS NOTES
Staff reviewed chart and patient has qualifying diagnosis for Phase II Cardiac Rehab.  Staff will contact patient in regards to scheduling or referring to another facility.

## 2019-04-03 ENCOUNTER — TELEPHONE (OUTPATIENT)
Dept: CARDIAC SURGERY | Facility: CLINIC | Age: 64
End: 2019-04-03

## 2019-04-08 ENCOUNTER — DOCUMENTATION (OUTPATIENT)
Dept: GENETICS | Facility: HOSPITAL | Age: 64
End: 2019-04-08

## 2019-04-08 NOTE — PROGRESS NOTES
Aristeo Foss was seen for genetic counseling due to a personal and family history of aortic aneurysm.  Mr. Foss has an ascending aortic aneurysm that was identified at age 64.  He was interested in discussing the features of familial thoracic aortic aneurysm and dissection (TAAD) and the available genetic testing.      Given the possibility that genetic testing would identify a hereditary risk for TAAD and provide information for the family, Mr. Foss decided to pursue genetic testing of the genes associated with TAAD.  The TAADNext panel was ordered from [employer] which includes 35 genes associated with aortic aneurysm.  Genetic testing was negative for mutations in the 35 genes included on the TAADNext panel.  These normal results were discussed with Mr. Foss by telephone on 4/8/2019.    FAMILY HISTORY:   Brother: Aortic aneurysm, 60s  Sister:  Aneurysm  Sister:  Aortic Aneurysm  Father:  Aneurysm      GENETIC COUNSELING:  We discussed the family history of aortic aneurysm.  There are different types of hereditary predispositions to aortic dissection.   One type is an autosomal dominant condition called Thoracic Aortic Aneurysm and Aortic Dissections or TAAD that causes aortic aneurysms and dissections in this specific region of the aorta.  With familial TAAD, the aortic dissection and aneurysm are the only findings associated with the condition.  Several genes have been associated with TAAD, these include ACTA2, TGFBR1, TGFBR2, and MYH11.  Another way you can see an inherited risk for aortic dissection is with particular syndromes, such as Marfan syndrome and Loeys-Alfie syndrome.  These are conditions that affect connective tissue in various parts of the body, so you would expect to see other features along with the risk for aortic dissection.  No members of Mr. Foss’s family are known to have been diagnosed with either of these conditions; however these conditions do have variable expression, so some  individuals identified to have a mutation may be quite mildly affected.  Familial TAAD as well as the syndromes associated with aortic aneurysm are inherited in an autosomal dominant pattern of inheritance, meaning that if either parent carried a mutation in a gene associated with one of these conditions, each child would have a 50% chance of inheriting that mutation.  Lastly, we discussed the possibility that the aortic aneurysm is a sporadic or multifactorial event not caused by a single gene.     GENETIC TESTING: The risks, benefits and limitations of genetic testing and implications for clinical management following testing were reviewed.  DNA test results can influence decisions regarding screening, prevention and surgical management.  Genetic testing can have significant psychological implications for both individuals and families.  Also discussed was the possibility of employment and insurance discrimination based on genetic test results and the laws in place to prevent this (NIMA), as well as the limitations of these laws.    We discussed panel testing, which would involve testing for multiple genes associated with TAAD.  The benefits and limitations of genetic testing were discussed and Mr. Foss decided to pursue testing via the panel. The implications of a positive or negative test result were discussed. We discussed the possibility that, in some cases, genetic test results may be informative or may be ambiguous due to the identification of a genetic variant. These variants may or may not impact the function of the gene.  Given his personal history, a negative test result does not eliminate all risk to his relatives, although the risk would not be as high as it would with positive genetic testing.      GENETIC TESTING: Mr. Foss’s testing was negative for mutations in the 35 genes associated with TAAD or related conditions.  This negative testing lowers, but does not eliminate, the risk of a hereditary  condition contributing to the family history of aneurysm.   Genetic testing is not indicated for Mr. Foss’s children since his testing was negative. Relatives should discuss the family history with their physician to determine if they should undergo any additional screening for aneurysm.    PLAN:  Genetic counseling remains available to Mr. Foss.  He is welcome to contact us with any questions or concerns.        Elaina Hernandez MS, [facility] [place], [facility]     Licensed Certified Genetic Counselor    Cc: Aristeo Evans MD

## 2019-04-08 NOTE — PROGRESS NOTES
Aristeo Foss was seen for genetic counseling due to a personal and family history of aortic aneurysm.  Mr. Foss has an ascending aortic aneurysm that was identified at age 64.  He was interested in discussing the features of familial thoracic aortic aneurysm and dissection (TAAD) and the available genetic testing.      Given the possibility that genetic testing would identify a hereditary risk for TAAD and provide information for the family, Mr. Foss decided to pursue genetic testing of the genes associated with TAAD.  The TAADNext panel was ordered from [employer] which includes 35 genes associated with aortic aneurysm.  Results are expected in 3-4 weeks.     FAMILY HISTORY:   Brother: Aortic aneurysm, 60s  Sister:  Aneurysm  Sister:  Aortic Aneurysm  Father:  aneurysm      GENETIC COUNSELING:  We discussed the family history of aortic aneurysm.  There are different types of hereditary predispositions to aortic dissection.   One type is an autosomal dominant condition called Thoracic Aortic Aneurysm and Aortic Dissections or TAAD that causes aortic aneurysms and dissections in this specific region of the aorta.  With familial TAAD, the aortic dissection and aneurysm are the only findings associated with the condition.  Several genes have been associated with TAAD, these include ACTA2, TGFBR1, TGFBR2, and MYH11.  Another way you can see an inherited risk for aortic dissection is with particular syndromes, such as Marfan syndrome and Loeys-Alfie syndrome.  These are conditions that affect connective tissue in various parts of the body, so you would expect to see other features along with the risk for aortic dissection.  No members of Mr. Foss’s family are known to have been diagnosed with either of these conditions; however these conditions do have variable expression, so some individuals identified to have a mutation may be quite mildly affected.  Familial TAAD as well as the syndromes associated with  aortic aneurysm are inherited in an autosomal dominant pattern of inheritance, meaning that if either parent carried a mutation in a gene associated with one of these conditions, each child would have a 50% chance of inheriting that mutation.  Lastly, we discussed the possibility that the aortic aneurysm is a sporadic or multifactorial event not caused by a single gene.     GENETIC TESTING: The risks, benefits and limitations of genetic testing and implications for clinical management following testing were reviewed.  DNA test results can influence decisions regarding screening, prevention and surgical management.  Genetic testing can have significant psychological implications for both individuals and families.  Also discussed was the possibility of employment and insurance discrimination based on genetic test results and the laws in place to prevent this (NIMA), as well as the limitations of these laws.    We discussed panel testing, which would involve testing for multiple genes associated with TAAD.  The benefits and limitations of genetic testing were discussed and Mr. Foss decided to pursue testing via the panel. The implications of a positive or negative test result were discussed. We discussed the possibility that, in some cases, genetic test results may be informative or may be ambiguous due to the identification of a genetic variant. These variants may or may not impact the function of the gene.  Given her personal history, a negative test result does not eliminate all risk to her relatives, although the risk would not be as high as it would with positive genetic testing.      PLAN:  Results are expected in 3-4 weeks and the patient will be contacted by telephone to discuss.  Mr. Foss is welcome to contact us with any questions or concerns.       Elaina Hernandez MS, [facility] [place], [facility]     Licensed Certified Genetic Counselor

## 2019-04-09 ENCOUNTER — DOCUMENTATION (OUTPATIENT)
Dept: CARDIAC REHAB | Facility: HOSPITAL | Age: 64
End: 2019-04-09

## 2019-07-08 DIAGNOSIS — Z00.6 EXAMINATION FOR NORMAL COMPARISON FOR CLINICAL RESEARCH: Primary | ICD-10-CM

## 2019-07-30 ENCOUNTER — OFFICE VISIT (OUTPATIENT)
Dept: CARDIAC SURGERY | Facility: CLINIC | Age: 64
End: 2019-07-30

## 2019-07-30 VITALS
SYSTOLIC BLOOD PRESSURE: 144 MMHG | DIASTOLIC BLOOD PRESSURE: 85 MMHG | WEIGHT: 258 LBS | HEART RATE: 60 BPM | OXYGEN SATURATION: 98 % | BODY MASS INDEX: 38.21 KG/M2 | HEIGHT: 69 IN | TEMPERATURE: 97.9 F

## 2019-07-30 DIAGNOSIS — I71.21 ASCENDING AORTIC ANEURYSM (HCC): Primary | ICD-10-CM

## 2019-07-30 PROCEDURE — 99213 OFFICE O/P EST LOW 20 MIN: CPT | Performed by: PHYSICIAN ASSISTANT

## 2019-07-30 NOTE — PROGRESS NOTES
07/30/2019  Patient Information  Aristeo YU [address] [address]   1955  'PCP/Referring Physician'  Danielle Lagos MD  [phone]  No ref. provider found    Chief Complaint   Patient presents with   • Follow-up     3 month follow up to discuss CT chest results for a ascending thoracic aneurysm.   • Thoracic Aneurysm       History of Present Illness:  Patient is a 64-year-old  male with history of hypertension, dyslipidemia, GERD, coronary artery disease with recent PCI performed 3/12/2019 by Dr. Wade and ascending aortic aneurysm with family history of aneurysm who presents to office for review and discussion of recent CT scan of the chest.  The patient was last seen on 2/12/2019 and complains of some occasional chest pain with activity but denies any abdominal pain, back pain, shortness of breath or difficulty with ambulation.  He presents to office today for review and discussion of recent CT scan of the chest.    Patient Active Problem List   Diagnosis   • Ascending aortic aneurysm (CMS/HCC)   • Family history of aneurysm   • Unstable angina (CMS/HCC)     Past Medical History:   Diagnosis Date   • Arthritis    • Cataract    • Chest pain    • Dyslipidemia    • GERD (gastroesophageal reflux disease)    • Hypertension    • Inguinal hernia    • Kidney stone    • Low iron    • SOB (shortness of breath)    • Umbilical hernia    • Unstable angina (CMS/HCC)      Past Surgical History:   Procedure Laterality Date   • CARDIAC CATHETERIZATION     • CARDIAC CATHETERIZATION N/A 3/12/2019    Procedure: Left Heart Cath;  Surgeon: Curtis Wade MD;  Location: [facility] CATH INVASIVE LOCATION;  Service: Cardiology   • CATARACT EXTRACTION Right    • KNEE SURGERY Right    • UMBILICAL HERNIA REPAIR         Current Outpatient Medications:   •  amLODIPine (NORVASC) 2.5 MG tablet, Take 2.5 mg by  mouth Daily., Disp: , Rfl:   •  aspirin 81 MG tablet, Take 1 tablet by mouth Daily., Disp: 30 tablet, Rfl: 11  •  clopidogrel (PLAVIX) 75 MG tablet, Take 1 tablet by mouth Daily., Disp: 90 tablet, Rfl: 3  •  HYDROcodone-acetaminophen (NORCO) 7.5-325 MG per tablet, hydrocodone 7.5 mg-acetaminophen 325 mg tablet, Disp: , Rfl:   •  lisinopril (PRINIVIL,ZESTRIL) 40 MG tablet, Take 40 mg by mouth Daily., Disp: , Rfl: 5  •  meloxicam (MOBIC) 15 MG tablet, Take 15 mg by mouth Daily., Disp: , Rfl:   •  pantoprazole (PROTONIX) 40 MG EC tablet, Take 40 mg by mouth Daily., Disp: , Rfl:   •  zolpidem (AMBIEN) 10 MG tablet, Take 10 mg by mouth At Night As Needed for Sleep., Disp: , Rfl:   No Known Allergies  Social History     Socioeconomic History   • Marital status:      Spouse name: Not on file   • Number of children: 3   • Years of education: Not on file   • Highest education level: Not on file   Occupational History   • Occupation: disabled farmer     Comment: back problems   Tobacco Use   • Smoking status: Never Smoker   • Smokeless tobacco: Former User   Substance and Sexual Activity   • Alcohol use: No     Frequency: Never   • Drug use: No   • Sexual activity: Defer   Social History Narrative    Lives in [facility]     Family History   Problem Relation Age of Onset   • Heart failure Mother    • Coronary artery disease Father      Review of Systems   Constitution: Positive for weakness and weight gain (in the past 2 months). Negative for chills, fever, malaise/fatigue, night sweats and weight loss.   HENT: Positive for hearing loss. Negative for odynophagia and sore throat.    Eyes: Negative.    Cardiovascular: Positive for chest pain. Negative for dyspnea on exertion, leg swelling, orthopnea and palpitations.   Respiratory: Negative.  Negative for cough and hemoptysis.    Endocrine: Negative.  Negative for cold intolerance, heat intolerance, polydipsia, polyphagia and polyuria.   Hematologic/Lymphatic:  "Bruises/bleeds easily.   Skin: Negative.  Negative for itching and rash.   Musculoskeletal: Positive for back pain, joint pain and muscle cramps. Negative for joint swelling and myalgias.   Gastrointestinal: Negative.  Negative for abdominal pain, constipation, diarrhea, hematemesis, hematochezia, melena, nausea and vomiting.   Genitourinary: Negative.  Negative for dysuria, frequency and hematuria.   Neurological: Positive for dizziness and loss of balance. Negative for focal weakness, headaches, numbness and seizures.   Psychiatric/Behavioral: Negative.  Negative for suicidal ideas.   Allergic/Immunologic: Negative.    All other systems reviewed and are negative.    Vitals:    19 1307   BP: 144/85   BP Location: Right arm   Patient Position: Sitting   Pulse: 60   Temp: 97.9 °F (36.6 °C)   SpO2: 98%   Weight: 117 kg (258 lb)   Height: 175.3 cm (69\")      Physical Exam:  Gen - NAD, pleasant, cooperative  CV - Regular rate and rhythm, no murmur gallop or rub  Pulm - Lungs clear to auscultation without wheeze or rhonchi   GI - Soft, normoactive bowel sounds, non-tender  Ext - Without edema  Neuro - CN II - XII grossly intact, tongue midline, voice normal      Labs/Imagin/8/19 US-Aorta @ [facility]  1.6cm Proximal  1.7cm Mid  1.8cm Distal    19 CT Scan Chest @ [facility]  Ascending TAA measured at 5.1cm      Assessment/Plan:  Patient is a 64-year-old  male with history of hypertension, dyslipidemia, GERD, coronary artery disease with recent PCI performed 3/12/2019 by Dr. Wade and ascending aortic aneurysm with family history of aneurysm who presents to office for review and discussion of recent CT scan of the chest.  The patient has been doing well since last being seen in office.  I personally reviewed his recent CT scan of the chest, and discussed the results with the patient, answering all questions to his satisfaction.  I encouraged the patient to discuss his blood pressure " today in office, which was 144/85, with his primary care provider to discuss possible medical management modification.  I instructed the patient to refrain from overexertion and heavy lifting.  I would like for the patient to follow-up in 6 months with CT scan of the chest for continued evaluation.  If he has any questions, concerns or acutely worsening symptoms during the interval he may call our office or present to the nearest emergency department immediately.    Patient Active Problem List   Diagnosis   • Ascending aortic aneurysm (CMS/HCC)   • Family history of aneurysm   • Unstable angina (CMS/HCC)

## 2020-01-30 ENCOUNTER — TELEPHONE (OUTPATIENT)
Dept: CARDIAC SURGERY | Facility: CLINIC | Age: 65
End: 2020-01-30

## 2020-01-31 DIAGNOSIS — I71.21 ASCENDING AORTIC ANEURYSM (HCC): Primary | ICD-10-CM

## 2020-03-17 ENCOUNTER — TELEPHONE (OUTPATIENT)
Dept: CARDIAC SURGERY | Facility: CLINIC | Age: 65
End: 2020-03-17

## 2020-03-17 NOTE — TELEPHONE ENCOUNTER
Spoke with patient's wife.  R/S appt from 4/28/20 to 5/26/20 secondary to the Covid-19 precautions.

## 2020-05-26 ENCOUNTER — OFFICE VISIT (OUTPATIENT)
Dept: CARDIAC SURGERY | Facility: CLINIC | Age: 65
End: 2020-05-26

## 2020-05-26 VITALS
TEMPERATURE: 98.4 F | SYSTOLIC BLOOD PRESSURE: 153 MMHG | HEIGHT: 69 IN | BODY MASS INDEX: 39.4 KG/M2 | HEART RATE: 75 BPM | DIASTOLIC BLOOD PRESSURE: 90 MMHG | OXYGEN SATURATION: 99 % | WEIGHT: 266 LBS

## 2020-05-26 DIAGNOSIS — E66.01 MORBIDLY OBESE (HCC): ICD-10-CM

## 2020-05-26 DIAGNOSIS — I71.21 ASCENDING AORTIC ANEURYSM (HCC): Primary | ICD-10-CM

## 2020-05-26 PROCEDURE — 99213 OFFICE O/P EST LOW 20 MIN: CPT | Performed by: NURSE PRACTITIONER

## 2020-05-26 RX ORDER — TAMSULOSIN HYDROCHLORIDE 0.4 MG/1
1 CAPSULE ORAL DAILY
COMMUNITY

## 2020-05-26 RX ORDER — ROSUVASTATIN CALCIUM 20 MG/1
20 TABLET, COATED ORAL DAILY
COMMUNITY

## 2020-05-26 NOTE — PROGRESS NOTES
[facility] Cardiothoracic Surgery Follow-Up Note    Name:  Aristeo Foss  MRN Number:  [id number]  Date of Encounter:  05/26/2020    Referred By:  No ref. provider found  PCP:  Danielle Lagos MD    Chief Complaint:    Chief Complaint   Patient presents with   • Follow-up     Follow up for a ascending thoracic aneurysm.   • Thoracic Aneurysm       History of Present Illness:    Mr. Aristeo Foss is a pleasant 65 y.o. male with a history of hypertension, dyslipidemia, GERD, coronary artery disease and ascending aortic aneurysm who returns to the office today for follow-up exam.  The patient was last seen in our office on 7/30/19 and denies interval abdominal pain, thoracic back pain, worsening shortness of breath or difficulty with ambulation.  The patient continues to have occasional chest pain.  He was recently evaluated by Dr. Wade with echocardiogram and reports he is doing well from a cardiac standpoint.  The patient does monitor his home blood pressure readings and despite elevated reading in the office today, these run within normal limits at home.    Review of Systems:  Review of Systems   Constitution: Negative for chills, fever, malaise/fatigue, night sweats and weight loss.   HENT: Positive for nosebleeds. Negative for hearing loss, odynophagia and sore throat.    Eyes: Positive for visual disturbance (retina detachment,left eye).   Cardiovascular: Positive for chest pain. Negative for dyspnea on exertion, leg swelling, orthopnea and palpitations.   Respiratory: Negative for cough and hemoptysis.    Endocrine: Negative for cold intolerance, heat intolerance, polydipsia, polyphagia and polyuria.   Hematologic/Lymphatic: Does not bruise/bleed easily.   Skin: Negative for itching and rash.   Musculoskeletal: Positive for arthritis and joint pain. Negative for joint swelling and myalgias.   Gastrointestinal: Negative for abdominal pain, constipation, diarrhea, hematemesis, hematochezia,  melena, nausea and vomiting.   Genitourinary: Positive for nocturia. Negative for dysuria, frequency and hematuria.   Neurological: Negative for focal weakness, headaches, numbness and seizures.   Psychiatric/Behavioral: Negative for suicidal ideas.   All other systems reviewed and are negative.      Past Medical History:    Past Medical History:   Diagnosis Date   • Arthritis    • Cataract    • Chest pain    • Coronary artery disease    • Dyslipidemia    • GERD (gastroesophageal reflux disease)    • Hypertension    • Inguinal hernia    • Kidney stone    • Low iron    • Retinal detachment     right eye   • SOB (shortness of breath)    • Umbilical hernia    • Unstable angina (CMS/HCC)        Past Surgical History:    Past Surgical History:   Procedure Laterality Date   • CARDIAC CATHETERIZATION     • CARDIAC CATHETERIZATION N/A 3/12/2019    Procedure: Left Heart Cath;  Surgeon: Curtis Wade MD;  Location: [facility] INVASIVE LOCATION;  Service: Cardiology   • CATARACT EXTRACTION Right    • CORONARY STENT PLACEMENT     • KNEE SURGERY Right    • UMBILICAL HERNIA REPAIR         Patient Active Problem List   Diagnosis   • Ascending aortic aneurysm (CMS/HCC)   • Family history of aneurysm   • Unstable angina (CMS/HCC)     Social History     Tobacco Use   • Smoking status: Never Smoker   • Smokeless tobacco: Former User   Substance Use Topics   • Alcohol use: No     Frequency: Never   • Drug use: No     Family History   Problem Relation Age of Onset   • Heart failure Mother    • Coronary artery disease Father        Medications:      Current Outpatient Medications:   •  amLODIPine (NORVASC) 2.5 MG tablet, Take 2.5 mg by mouth Daily., Disp: , Rfl:   •  aspirin 81 MG tablet, Take 1 tablet by mouth Daily., Disp: 30 tablet, Rfl: 11  •  clopidogrel (PLAVIX) 75 MG tablet, Take 1 tablet by mouth Daily., Disp: 90 tablet, Rfl: 3  •  HYDROcodone-acetaminophen (NORCO) 7.5-325 MG per tablet, hydrocodone 7.5 mg-acetaminophen  "325 mg tablet, Disp: , Rfl:   •  lisinopril (PRINIVIL,ZESTRIL) 40 MG tablet, Take 40 mg by mouth Daily., Disp: , Rfl: 5  •  meloxicam (MOBIC) 15 MG tablet, Take 15 mg by mouth Daily., Disp: , Rfl:   •  pantoprazole (PROTONIX) 40 MG EC tablet, Take 40 mg by mouth Daily., Disp: , Rfl:   •  rosuvastatin (CRESTOR) 20 MG tablet, Take 20 mg by mouth Daily., Disp: , Rfl:   •  tamsulosin (FLOMAX) 0.4 MG capsule 24 hr capsule, Take 1 capsule by mouth Daily., Disp: , Rfl:   •  zolpidem (AMBIEN) 10 MG tablet, Take 10 mg by mouth At Night As Needed for Sleep., Disp: , Rfl:     Allergies:  No Known Allergies    Physical Exam:  Vital Signs:    Vitals:    05/26/20 1008   BP: 153/90   BP Location: Left arm   Patient Position: Sitting   Pulse: 75   Temp: 98.4 °F (36.9 °C)   SpO2: 99%   Weight: 121 kg (266 lb)   Height: 175.3 cm (69\")       Physical Exam   Gen- NAD, pleasant, cooperative  CV- Regular rate and rhythm, no murmur, gallop or rub  Pulm- Clear to auscultation bilateral without wheeze or rhonchi  GI- Soft, normoactive bowel sounds, non-tender  Ext- Without edema  Neuro- CN II- XII grossly intact, tongue midline, voice normal.    Labs/Imaging:  CT Chest W/ Contrast, [facility], 2/14/20  Impression:  Stable 5.1 cm ascending aortic aneurysm  I personally reviewed these images in the office today.    Assessment / Plan:  Mr. Aristeo Foss is a pleasant 65 y.o. male with a history of hypertension, dyslipidemia, GERD, coronary artery disease and ascending aortic aneurysm who returns to the office today for follow-up exam.  Overall, the patient is doing well.  I have encouraged the patient to continue to monitor his blood pressure readings at home.  I have instructed the patient to refrain from heavy lifting and I have discussed the importance of maintaining normal blood pressure with the patient in the office today.  The patient does not smoke.  At this time, I will plan to see the patient back in the " office in approximately one year with a repeat  CT scan of the chest.    Please note, this document was produced using voice recognition software.    WOLF Butler  [facility] Cardiothoracic Surgery

## 2020-05-27 DIAGNOSIS — Z00.6 EXAMINATION FOR NORMAL COMPARISON FOR CLINICAL RESEARCH: Primary | ICD-10-CM

## 2021-04-09 DIAGNOSIS — I71.21 ASCENDING AORTIC ANEURYSM (HCC): Primary | ICD-10-CM

## 2021-04-22 ENCOUNTER — TELEPHONE (OUTPATIENT)
Dept: CARDIAC SURGERY | Facility: CLINIC | Age: 66
End: 2021-04-22

## 2021-04-22 NOTE — TELEPHONE ENCOUNTER
Pt received a letter in the mail and is ready to schedule his f/u and CT chest with Dr. Evans. Verified demo. Pt is okay to have appt in [place] or [place] but would like his CT to be done in [place]. He does have an appt with Dr. Wade on 5/19. Verified demo.

## 2021-07-27 ENCOUNTER — OFFICE VISIT (OUTPATIENT)
Dept: CARDIAC SURGERY | Facility: CLINIC | Age: 66
End: 2021-07-27

## 2021-07-27 VITALS
BODY MASS INDEX: 39.71 KG/M2 | OXYGEN SATURATION: 98 % | TEMPERATURE: 97.1 F | SYSTOLIC BLOOD PRESSURE: 128 MMHG | WEIGHT: 262 LBS | DIASTOLIC BLOOD PRESSURE: 70 MMHG | HEIGHT: 68 IN | HEART RATE: 67 BPM

## 2021-07-27 DIAGNOSIS — I71.21 ASCENDING AORTIC ANEURYSM (HCC): Primary | ICD-10-CM

## 2021-07-27 PROBLEM — I10 HTN (HYPERTENSION): Status: ACTIVE | Noted: 2021-07-27

## 2021-07-27 PROBLEM — I25.10 CAD S/P PERCUTANEOUS CORONARY ANGIOPLASTY: Status: ACTIVE | Noted: 2021-07-27

## 2021-07-27 PROBLEM — E78.5 HLD (HYPERLIPIDEMIA): Status: ACTIVE | Noted: 2021-07-27

## 2021-07-27 PROBLEM — Z98.61 CAD S/P PERCUTANEOUS CORONARY ANGIOPLASTY: Status: ACTIVE | Noted: 2021-07-27

## 2021-07-27 PROCEDURE — 99213 OFFICE O/P EST LOW 20 MIN: CPT | Performed by: NURSE PRACTITIONER

## 2021-07-27 RX ORDER — DOXYCYCLINE 100 MG/1
CAPSULE ORAL
COMMUNITY
Start: 2021-07-17

## 2021-07-27 NOTE — PROGRESS NOTES
[facility] Cardiothoracic Surgery Office Follow Up Note     Date of Encounter: 2021     Name: Aristeo Foss  : 1955     Referred By: No ref. provider found  PCP: Danielle Lagos MD    Chief Complaint:    Chief Complaint   Patient presents with   • Follow-up     1 yr f/u with CT chest for TAA        Subjective      History of Present Illness:    Aristeo Foss is a 66 y.o. male non-smoker with history of HTN, HLD on statin therapy, CAD s/p stenting in , and incidental 5cm ascending TAA being followed by Dr Evans since 2019. Last seen in clinic 2020 with Kylee BLOOM with stable aneurysm measuring 5.1 cm. He presents to clinic today for his annual surveillance with new imaging. He denies any chest pain, back pain or scapular pain. 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 He does have familial hx of aneurysms in multiple siblings.    Review of Systems:  Review of Systems   Constitutional: Negative for chills, decreased appetite, diaphoresis, fever, malaise/fatigue, night sweats, weight gain and weight loss.   HENT: Negative for hoarse voice.    Eyes: Negative for blurred vision, double vision and visual disturbance.   Cardiovascular: Negative for chest pain, claudication, dyspnea on exertion, irregular heartbeat, leg swelling, near-syncope, orthopnea, palpitations, paroxysmal nocturnal dyspnea and syncope.   Respiratory: Negative for cough, hemoptysis, shortness of breath, sputum production and wheezing.    Hematologic/Lymphatic: Negative for adenopathy and bleeding problem. Does not bruise/bleed easily.   Skin: Negative for color change, nail changes, poor wound healing and rash.   Musculoskeletal: Negative for back pain, falls and muscle cramps.   Gastrointestinal: Negative for abdominal pain, dysphagia and heartburn.   Genitourinary: Negative for flank pain.   Neurological: Negative for brief paralysis, disturbances in coordination,  dizziness, focal weakness, headaches, light-headedness, loss of balance, numbness, paresthesias, sensory change, vertigo and weakness.   Psychiatric/Behavioral: Negative for depression and suicidal ideas.   Allergic/Immunologic: Negative for persistent infections.       I have reviewed the following portions of the patient's history: allergies, current medications, past family history, past medical history, past social history, past surgical history and problem list and confirm it's accurate.    Allergies:  No Known Allergies    Medications:      Current Outpatient Medications:   •  amLODIPine (NORVASC) 2.5 MG tablet, Take 2.5 mg by mouth Daily., Disp: , Rfl:   •  aspirin 81 MG tablet, Take 1 tablet by mouth Daily., Disp: 30 tablet, Rfl: 11  •  clopidogrel (PLAVIX) 75 MG tablet, Take 1 tablet by mouth Daily., Disp: 90 tablet, Rfl: 3  •  doxycycline (MONODOX) 100 MG capsule, , Disp: , Rfl:   •  HYDROcodone-acetaminophen (NORCO) 7.5-325 MG per tablet, hydrocodone 7.5 mg-acetaminophen 325 mg tablet, Disp: , Rfl:   •  lisinopril (PRINIVIL,ZESTRIL) 40 MG tablet, Take 40 mg by mouth Daily., Disp: , Rfl: 5  •  meloxicam (MOBIC) 15 MG tablet, Take 15 mg by mouth Daily., Disp: , Rfl:   •  pantoprazole (PROTONIX) 40 MG EC tablet, Take 40 mg by mouth Daily., Disp: , Rfl:   •  rosuvastatin (CRESTOR) 20 MG tablet, Take 20 mg by mouth Daily., Disp: , Rfl:   •  tamsulosin (FLOMAX) 0.4 MG capsule 24 hr capsule, Take 1 capsule by mouth Daily., Disp: , Rfl:   •  zolpidem (AMBIEN) 10 MG tablet, Take 10 mg by mouth At Night As Needed for Sleep., Disp: , Rfl:     History:   Past Medical History:   Diagnosis Date   • Arthritis    • Cataract    • Chest pain    • Coronary artery disease    • Dyslipidemia    • GERD (gastroesophageal reflux disease)    • Hypertension    • Inguinal hernia    • Kidney stone    • Low iron    • Retinal detachment     right eye   • SOB (shortness of breath)    • Umbilical hernia    • Unstable angina (CMS/HCC)   "      Past Surgical History:   Procedure Laterality Date   • CARDIAC CATHETERIZATION     • CARDIAC CATHETERIZATION N/A 3/12/2019    Procedure: Left Heart Cath;  Surgeon: Curtis Wade MD;  Location: [facility] INVASIVE LOCATION;  Service: Cardiology   • CATARACT EXTRACTION Right    • CORONARY STENT PLACEMENT     • KNEE SURGERY Right    • UMBILICAL HERNIA REPAIR         Social History     Socioeconomic History   • Marital status:      Spouse name: Not on file   • Number of children: 3   • Years of education: Not on file   • Highest education level: Not on file   Tobacco Use   • Smoking status: Never Smoker   • Smokeless tobacco: Former User   Substance and Sexual Activity   • Alcohol use: No   • Drug use: No   • Sexual activity: Defer        Family History   Problem Relation Age of Onset   • Heart failure Mother    • Coronary artery disease Father        Objective     Physical Exam:  Vitals:    07/27/21 0954   BP: 128/70   BP Location: Left arm   Pulse: 67   Temp: 97.1 °F (36.2 °C)   SpO2: 98%   Weight: 119 kg (262 lb)   Height: 172.7 cm (68\")      Body mass index is 39.84 kg/m².    Physical Exam  Vitals and nursing note reviewed.   Constitutional:       Appearance: Normal appearance. He is well-developed. He is obese.   HENT:      Head: Normocephalic and atraumatic.   Eyes:      Pupils: Pupils are equal, round, and reactive to light.   Neck:      Vascular: No carotid bruit.   Cardiovascular:      Rate and Rhythm: Normal rate and regular rhythm.      Pulses: Normal pulses.      Heart sounds: Normal heart sounds, S1 normal and S2 normal. No murmur heard.     Pulmonary:      Effort: Pulmonary effort is normal.      Breath sounds: Normal breath sounds.   Abdominal:      Palpations: Abdomen is soft.   Musculoskeletal:         General: No swelling.      Cervical back: Neck supple.      Right lower leg: No edema.      Left lower leg: No edema.   Skin:     General: Skin is warm and dry.      Capillary Refill: " Capillary refill takes less than 2 seconds.      Findings: No bruising.   Neurological:      General: No focal deficit present.      Mental Status: He is alert and oriented to person, place, and time. Mental status is at baseline.      GCS: GCS eye subscore is 4. GCS verbal subscore is 5. GCS motor subscore is 6.      Motor: Motor function is intact.      Coordination: Coordination is intact.      Gait: Gait is intact.   Psychiatric:         Mood and Affect: Mood normal.         Speech: Speech normal.         Behavior: Behavior normal. Behavior is cooperative.         Cognition and Memory: Cognition normal.         Imaging/Labs:  CTA chest with and without contrast ([facility]) 5/11/2021:  Findings: Heart-generalized enlargement.  LAD stent.  Aorta-stable 5.1 cm aneurysmal dilation of the ascending thoracic aorta.  Mild atherosclerotic plaque formation.  Lungs-interval increase in the 4.1 cm diameter central pulmonary artery.  No evidence for pulmonary thromboembolism.  Stable calcified granulomas in the anterior aspect of the right upper lobe.  The lungs are otherwise clear.  Mediastinum-no lymphadenopathy.  Bones-no significant skeletal abnormalities.  Impression: Stable 5.1 cm aneurysmal dilation of the ascending thoracic aorta.  Cardiomegaly.  Dilation of the central pulmonary artery consistent with pulmonary arterial hypertension.    Personally reviewed and measured at 4.9 - 5.0cm.    CT chest with contrast ([facility]) 2/14/2020:  Impression: Stable 5.1 cm ascending aortic aneurysm.  Other findings as noted.    Ultrasound aorta ([facility]) 2/8/2019:  1.6 cm proximal.  1.7 cm mid.  1.8 cm distal    Assessment / Plan      Assessment / Plan:  Diagnoses and all orders for this visit:    1. Ascending aortic aneurysm (CMS/HCC) (Primary)  -     CT Angiogram Chest; Future       Aristeo Foss is a 66 y.o. male non-smoker with history of HTN, HLD on statin therapy, CAD s/p stenting in 2019, and  incidental 5cm ascending TAA being followed by Dr Evans since 1/2019. Last seen in clinic 5/2020 with Kylee BLOOM with stable aneurysm measuring 5.1 cm. He presents to clinic today for his annual surveillance with new imaging. He denies any chest pain, back pain or scapular pain. 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 He does have familial hx of aneurysms in multiple siblings. His exam is benign in clinic today. He has good blood pressure control. His imaging from this year demonstrated a stable 5.0cm ascending TAA. We will plan to continue annual surveillance. Education and plan of care discussed with patient and spouse. They verbalized understanding, are agreeable, and have no further questions.    Follow Up:   Return in about 1 year (around 7/27/2022) for Imaging next visit.   Or sooner for any further concerns or worsening sign and symptoms. If unable to reach us in the office please dial 911 or go to the nearest emergency department.      Malaika BLOOM  [facility] Cardiothoracic Surgery    Time Spent: I spent 22 minutes caring for Aristeo on this date of service. This time includes time spent by me in the following activities: preparing for the visit, reviewing tests, obtaining and/or reviewing a separately obtained history, performing a medically appropriate examination and/or evaluation, counseling and educating the patient/family/caregiver, ordering medications, tests, or procedures, documenting information in the medical record and independently interpreting results and communicating that information with the patient/family/caregiver.

## 2021-07-28 DIAGNOSIS — Z00.6 EXAMINATION FOR NORMAL COMPARISON FOR CLINICAL RESEARCH: Primary | ICD-10-CM

## 2022-05-03 ENCOUNTER — TELEPHONE (OUTPATIENT)
Dept: CARDIAC SURGERY | Facility: CLINIC | Age: 67
End: 2022-05-03

## 2022-05-03 NOTE — TELEPHONE ENCOUNTER
FYI:  Mrs. Foss called to notify Dr. Evans that he was admitted to [facility] last weekend for A-Fib. He was started on Eliquis and cardizem and was instructed to discontinue clopidogrel, aspirin, and amlodipine. He is feeling much better today.

## 2022-07-12 DIAGNOSIS — I71.21 ASCENDING AORTIC ANEURYSM: ICD-10-CM

## 2022-07-28 ENCOUNTER — OFFICE VISIT (OUTPATIENT)
Dept: CARDIAC SURGERY | Facility: CLINIC | Age: 67
End: 2022-07-28

## 2022-07-28 DIAGNOSIS — I71.21 ASCENDING AORTIC ANEURYSM: Primary | ICD-10-CM

## 2022-07-28 PROCEDURE — 99442 PR PHYS/QHP TELEPHONE EVALUATION 11-20 MIN: CPT | Performed by: NURSE PRACTITIONER

## 2022-07-28 RX ORDER — APIXABAN 5 MG/1
TABLET, FILM COATED ORAL 2 TIMES DAILY
COMMUNITY
Start: 2022-07-07

## 2022-07-28 NOTE — PROGRESS NOTES
[facility] Cardiothoracic Surgery Telephone visit    You have chosen to receive care through a telephone visit. Do you consent to use a telephone visit for your medical care today? Yes     Date of Encounter: 07/28/2022     MRN Number:  [id number]  Name:  Aristeo Foss  Phone Number: [phone]     Referred By: No ref. provider found  PCP:  Danielle Lagos MD    Chief Complaint:    Chief Complaint   Patient presents with   • Follow-up     1 YR FU with CTA Chest-Ascending Aneursym       History of Present Illness:      Aristeo Foss is a 67 y.o. male with a history of hypertension, hyperlipidemia, former tobacco use, CAD s/p stents 2019 and ascending aortic aneurysm.  Patient presents today for annual follow-up of his TAA.  Last seen in the office on 7/27/2021 with WOLF Parker.  Since last office visit, patient has been doing well.  He was diagnosed with atrial fibrillation in April with a visit to [facility] and initiated on Eliquis therapy.  At that time, they discontinued his aspirin and Plavix therapy.  He denies any unusual chest, back or flank pain.  He does have stable blood pressures.  Plans to follow-up with Dr. Wade next month.    Review of Systems:  Review of Systems   Constitutional: Negative for chills, decreased appetite, diaphoresis, fever, malaise/fatigue and weight loss.   HENT: Negative for congestion, hoarse voice, sore throat and stridor.    Cardiovascular: Positive for leg swelling and palpitations (couple of months ago). Negative for chest pain, claudication, dyspnea on exertion, irregular heartbeat, near-syncope, orthopnea, paroxysmal nocturnal dyspnea and syncope.   Respiratory: Negative for cough, hemoptysis, shortness of breath, sleep disturbances due to breathing, snoring, sputum production and wheezing.    Hematologic/Lymphatic: Negative for adenopathy and bleeding problem. Bruises/bleeds easily.   Skin: Negative for color change, dry skin, itching, poor wound  healing and rash.   Musculoskeletal: Positive for arthritis, back pain and joint pain. Negative for falls and muscle weakness.   Gastrointestinal: Negative for abdominal pain, anorexia, constipation, diarrhea, hematochezia, melena, nausea and vomiting.   Neurological: Positive for light-headedness. Negative for difficulty with concentration, disturbances in coordination, dizziness, loss of balance, numbness, seizures, vertigo and weakness.   Psychiatric/Behavioral: Negative for altered mental status, depression, memory loss and substance abuse. The patient does not have insomnia and is not nervous/anxious.    Allergic/Immunologic: Negative for persistent infections.       Past Medical History:    Past Medical History:   Diagnosis Date   • Arthritis    • Cataract    • Chest pain    • Coronary artery disease    • Dyslipidemia    • Fatty tumor    • GERD (gastroesophageal reflux disease)    • Hypertension    • Inguinal hernia    • Kidney stone    • Low iron    • Retinal detachment     right eye   • SOB (shortness of breath)    • Umbilical hernia    • Unstable angina (HCC)        Past Surgical History:    Past Surgical History:   Procedure Laterality Date   • CARDIAC CATHETERIZATION     • CARDIAC CATHETERIZATION N/A 3/12/2019    Procedure: Left Heart Cath;  Surgeon: Curtis Wade MD;  Location: [facility] INVASIVE LOCATION;  Service: Cardiology   • CATARACT EXTRACTION Right    • CORONARY STENT PLACEMENT     • KNEE SURGERY Right    • UMBILICAL HERNIA REPAIR         Allergies:  No Known Allergies    Medications:      Current Outpatient Medications:   •  dilTIAZem (CARDIZEM) 30 MG tablet, 2 (Two) Times a Day., Disp: , Rfl:   •  Eliquis 5 MG tablet tablet, 2 (Two) Times a Day., Disp: , Rfl:   •  HYDROcodone-acetaminophen (NORCO) 7.5-325 MG per tablet, hydrocodone 7.5 mg-acetaminophen 325 mg tablet, Disp: , Rfl:   •  lisinopril (PRINIVIL,ZESTRIL) 40 MG tablet, Take 40 mg by mouth Daily., Disp: , Rfl: 5  •   pantoprazole (PROTONIX) 40 MG EC tablet, Take 40 mg by mouth Daily., Disp: , Rfl:   •  rosuvastatin (CRESTOR) 20 MG tablet, Take 20 mg by mouth Daily., Disp: , Rfl:   •  tamsulosin (FLOMAX) 0.4 MG capsule 24 hr capsule, Take 1 capsule by mouth Daily., Disp: , Rfl:   •  amLODIPine (NORVASC) 2.5 MG tablet, Take 2.5 mg by mouth Daily. (Patient not taking: Reported on 7/28/2022), Disp: , Rfl:   •  aspirin 81 MG tablet, Take 1 tablet by mouth Daily. (Patient not taking: Reported on 7/28/2022), Disp: 30 tablet, Rfl: 11  •  clopidogrel (PLAVIX) 75 MG tablet, Take 1 tablet by mouth Daily. (Patient not taking: Reported on 7/28/2022), Disp: 90 tablet, Rfl: 3  •  doxycycline (MONODOX) 100 MG capsule, , Disp: , Rfl:   •  meloxicam (MOBIC) 15 MG tablet, Take 15 mg by mouth Daily. (Patient not taking: Reported on 7/28/2022), Disp: , Rfl:   •  zolpidem (AMBIEN) 10 MG tablet, Take 10 mg by mouth At Night As Needed for Sleep. (Patient not taking: Reported on 7/28/2022), Disp: , Rfl:     Physical Exam:  Physical Exam  Pulmonary:      Effort: Pulmonary effort is normal.   Neurological:      Mental Status: He is alert.   Psychiatric:         Mood and Affect: Mood normal.         Labs/Imaging:  CT chest with and without contrast 7/11/2022 [facility]: 5.0 cm ascending aortic aneurysm.  Heavy coronary artery calcifications.  3 calcified granulomas in the right middle lobe.    Personally reviewed    Assessment / Plan:  Diagnoses and all orders for this visit:    1. Ascending aortic aneurysm (HCC) (Primary)    Aristeo Foss is a 67 y.o. male with a history of hypertension, hyperlipidemia, former tobacco use, A. fib, CAD s/p stents 2019 and ascending aortic aneurysm.  Discussed findings of stable CT chest with ascending aortic aneurysm measuring 5.0 to 5.2 cm on annual imaging since 2019.  Discussed with patient that Dr. Evans does not surgically intervene on ascending aortic aneurysms until they reach 5.5 cm.  He denies any  unusual chest, back or flank pain.  Blood pressures have been stable.  Patient has been recently diagnosed with A. fib and initiated on Eliquis therapy.  At that time, he was asked to discontinue his aspirin and Plavix therapy.  With his history of coronary artery stenting, have asked him to reach out to Dr. Wade's office to see if he wants him to take 81 mg aspirin daily along with his Eliquis.  We will plan to follow-up in 1 year with repeat CT chest.     This visit has been rescheduled as a phone visit to comply with patient safety concerns in accordance with CDC recommendations. Total time of discussion was 13 minutes.     WOLF Lawson  [facility] Cardiothoracic Surgery  07/28/22  08:11 EDT

## 2023-01-13 ENCOUNTER — TELEPHONE (OUTPATIENT)
Dept: CARDIAC SURGERY | Facility: CLINIC | Age: 68
End: 2023-01-13

## 2023-01-13 NOTE — TELEPHONE ENCOUNTER
Lvm for pt stating we cannot give clearance for surgery and he would need to contact cardio or pcp

## 2023-01-13 NOTE — TELEPHONE ENCOUNTER
Caller: ADALBERTO PACHECO    Relationship: Emergency Contact    Best call back number:     What is the best time to reach you: ANY    Who are you requesting to speak with (clinical staff, provider,  specific staff member): ANY     Do you know the name of the person who called: PT SPOUSE     What was the call regarding:  PT HAS AN 5.2 ANERYSMS AND TWO HEART STANDS. PT WANTS TO KNOW IF IT IS OKAY TO BE CLEARED FOR KNEE SURGERY. PT IS IN ALOT OF PAIN AND WOULD LIKE CLEARANCE FROM  THAT IT IS OKAY TO PROCEED BEFORE HAVING SURGERY WITH .    Do you require a callback: YES

## 2023-05-22 DIAGNOSIS — I71.21 ANEURYSM OF ASCENDING AORTA WITHOUT RUPTURE: Primary | ICD-10-CM

## 2023-07-26 DIAGNOSIS — I71.21 ANEURYSM OF ASCENDING AORTA WITHOUT RUPTURE: ICD-10-CM

## 2023-08-01 ENCOUNTER — OFFICE VISIT (OUTPATIENT)
Dept: CARDIAC SURGERY | Facility: CLINIC | Age: 68
End: 2023-08-01
Payer: MEDICARE

## 2023-08-01 VITALS
HEIGHT: 68 IN | DIASTOLIC BLOOD PRESSURE: 72 MMHG | OXYGEN SATURATION: 98 % | SYSTOLIC BLOOD PRESSURE: 152 MMHG | BODY MASS INDEX: 38.86 KG/M2 | TEMPERATURE: 97.1 F | WEIGHT: 256.4 LBS | HEART RATE: 52 BPM

## 2023-08-01 DIAGNOSIS — I71.21 ANEURYSM OF ASCENDING AORTA WITHOUT RUPTURE: Primary | ICD-10-CM

## 2023-08-01 PROCEDURE — 3077F SYST BP >= 140 MM HG: CPT | Performed by: NURSE PRACTITIONER

## 2023-08-01 PROCEDURE — 1159F MED LIST DOCD IN RCRD: CPT | Performed by: NURSE PRACTITIONER

## 2023-08-01 PROCEDURE — 99213 OFFICE O/P EST LOW 20 MIN: CPT | Performed by: NURSE PRACTITIONER

## 2023-08-01 PROCEDURE — 3078F DIAST BP <80 MM HG: CPT | Performed by: NURSE PRACTITIONER

## 2023-08-01 PROCEDURE — 1160F RVW MEDS BY RX/DR IN RCRD: CPT | Performed by: NURSE PRACTITIONER

## 2023-08-01 RX ORDER — GABAPENTIN 300 MG/1
300 CAPSULE ORAL AS NEEDED
COMMUNITY
Start: 2023-06-15

## 2024-06-17 DIAGNOSIS — I71.20 THORACIC AORTIC ANEURYSM (TAA), UNSPECIFIED PART, UNSPECIFIED WHETHER RUPTURED: Primary | ICD-10-CM

## 2024-07-22 ENCOUNTER — TELEPHONE (OUTPATIENT)
Dept: CARDIAC SURGERY | Facility: CLINIC | Age: 69
End: 2024-07-22

## 2024-07-22 NOTE — TELEPHONE ENCOUNTER
Caller: Aristeo Foss    Relationship: Self    Best call back number: [phone]    What orders are you requesting (i.e. lab or imaging): CTA    In what timeframe would the patient need to come in: AUGUST    Where will you receive your lab/imaging services: [facility]    Additional notes: PT WOULD LIKE TO HAVE ORDER SENT TO [place] FOR CTA. THANK YOU

## 2024-08-01 ENCOUNTER — TELEPHONE (OUTPATIENT)
Dept: CARDIAC SURGERY | Facility: CLINIC | Age: 69
End: 2024-08-01
Payer: MEDICARE

## 2024-08-01 NOTE — TELEPHONE ENCOUNTER
I called patient on 8/1/24 to see if they have had their testing. I left a voicemail for patient to call us back.

## 2024-08-02 DIAGNOSIS — I71.20 THORACIC AORTIC ANEURYSM (TAA), UNSPECIFIED PART, UNSPECIFIED WHETHER RUPTURED: ICD-10-CM

## 2024-08-06 ENCOUNTER — TELEPHONE (OUTPATIENT)
Dept: CARDIAC SURGERY | Facility: CLINIC | Age: 69
End: 2024-08-06

## 2024-08-06 NOTE — TELEPHONE ENCOUNTER
Caller: Aristeo Foss    Relationship to patient: Self    Best call back number: [phone]     Chief complaint: TRANSPORTATION     Type of visit: FOLLOW UP     Requested date: TELEHEALTH VIA [employer]     If rescheduling, when is the original appointment: 8/15/24     Additional notes:PATIENT WOULD LIKE A CALL BACK.

## 2024-09-03 ENCOUNTER — TELEPHONE (OUTPATIENT)
Dept: CARDIAC SURGERY | Facility: CLINIC | Age: 69
End: 2024-09-03

## 2024-09-03 NOTE — TELEPHONE ENCOUNTER
Caller: NILO RODRIGUEZ    Relationship to patient: Child    Best call back number: [phone]     Type of visit: 1YR FOLLOW UP     Requested date: TELEHEALTH      If rescheduling, when is the original appointment: 9/5/24     Additional notes: PATIENTS DAUGHTER NILO WOULD LIKE A CALL BACK.

## 2024-09-05 ENCOUNTER — OFFICE VISIT (OUTPATIENT)
Dept: CARDIAC SURGERY | Facility: CLINIC | Age: 69
End: 2024-09-05
Payer: MEDICARE

## 2024-09-05 VITALS
HEART RATE: 73 BPM | TEMPERATURE: 98.3 F | BODY MASS INDEX: 38.55 KG/M2 | HEIGHT: 69 IN | DIASTOLIC BLOOD PRESSURE: 82 MMHG | WEIGHT: 260.3 LBS | OXYGEN SATURATION: 98 % | SYSTOLIC BLOOD PRESSURE: 132 MMHG

## 2024-09-05 DIAGNOSIS — Z82.49 FAMILY HISTORY OF ANEURYSM: ICD-10-CM

## 2024-09-05 DIAGNOSIS — I71.21 ANEURYSM OF ASCENDING AORTA WITHOUT RUPTURE: Primary | ICD-10-CM

## 2024-09-05 PROCEDURE — 99214 OFFICE O/P EST MOD 30 MIN: CPT | Performed by: NURSE PRACTITIONER

## 2024-09-05 PROCEDURE — 3079F DIAST BP 80-89 MM HG: CPT | Performed by: NURSE PRACTITIONER

## 2024-09-05 PROCEDURE — 3075F SYST BP GE 130 - 139MM HG: CPT | Performed by: NURSE PRACTITIONER

## 2024-09-05 NOTE — PROGRESS NOTES
[facility] Cardiothoracic Surgery Office Follow Up Note     Date of Encounter: 2024     Name: Aristeo Foss  : 1955     Referred By: No ref. provider found  PCP: Danielle Lagos MD    Chief Complaint:    Chief Complaint   Patient presents with    Follow-up     1 year follow up with CT chest for surveillance of TAA. Pt states that he has had A-fib in the past but is doing ok today, states that he tries to active denies        Subjective      History of Present Illness:    Aristeo Foss is a 69 y.o. male non-smoker with history of HTN, HLD on statin therapy, a.fib on Eliquis, CAD s/p PCI 2019, and incidental 5cm ascending TAA being followed by Dr Evans since 2019.  He does have positive familial history of aneurysmal disease and multiple siblings (4/6 siblings), one of which required surgical repair. He presents today for his annual surveillance.  He denies chest pain, upper back, or scapular pain.  He does endorse RANGEL.  He follows with Dr Wade annually.  States BP checks at home are well controlled <130/80 mmHg.      Review of Systems:  Review of Systems   Constitutional: Positive for malaise/fatigue (from time to time). Negative for chills, fever, night sweats, weight gain and weight loss.   HENT:  Positive for congestion (sinus allergies), nosebleeds and odynophagia. Negative for hearing loss.    Eyes:  Positive for blurred vision (often related to dry eyes).   Cardiovascular:  Positive for leg swelling (bilateral ankle swelling). Negative for chest pain, claudication, dyspnea on exertion, orthopnea, palpitations and syncope.   Respiratory:  Positive for cough. Negative for hemoptysis, shortness of breath and wheezing.    Endocrine: Negative for cold intolerance, heat intolerance, polydipsia, polyphagia and polyuria.   Hematologic/Lymphatic: Does not bruise/bleed easily.   Skin:  Negative for itching, poor wound healing and rash.   Musculoskeletal:  Positive for arthritis, back  "pain, joint pain and joint swelling. Negative for myalgias.   Gastrointestinal:  Positive for constipation (often). Negative for abdominal pain, diarrhea, hematemesis, melena, nausea and vomiting.   Genitourinary:  Negative for dysuria, frequency, hematuria, nocturia and urgency.   Neurological:  Positive for loss of balance (often pt states that he \"staggers to the right\"). Negative for dizziness, light-headedness, numbness and weakness (often).   Psychiatric/Behavioral:  Negative for depression and suicidal ideas. The patient is not nervous/anxious.    Allergic/Immunologic: Positive for environmental allergies. Negative for HIV exposure.       I have reviewed the following portions of the patient's history: problem list, current medications, allergies, past surgical history, past medical history, past social history, past family history, and ROS and confirm it's accurate.    Allergies:  No Known Allergies    Medications:      Current Outpatient Medications:     aspirin 81 MG tablet, Take 1 tablet by mouth Daily., Disp: 30 tablet, Rfl: 11    clopidogrel (PLAVIX) 75 MG tablet, Take 1 tablet by mouth Daily., Disp: 90 tablet, Rfl: 3    dilTIAZem (CARDIZEM) 30 MG tablet, 2 (Two) Times a Day., Disp: , Rfl:     Eliquis 5 MG tablet tablet, 2 (Two) Times a Day., Disp: , Rfl:     HYDROcodone-acetaminophen (NORCO) 7.5-325 MG per tablet, hydrocodone 7.5 mg-acetaminophen 325 mg tablet, Disp: , Rfl:     lisinopril (PRINIVIL,ZESTRIL) 40 MG tablet, Take 1 tablet by mouth Daily., Disp: , Rfl: 5    pantoprazole (PROTONIX) 40 MG EC tablet, Take 1 tablet by mouth Daily., Disp: , Rfl:     rosuvastatin (CRESTOR) 20 MG tablet, Take 1 tablet by mouth Daily., Disp: , Rfl:     tamsulosin (FLOMAX) 0.4 MG capsule 24 hr capsule, Take 1 capsule by mouth Daily., Disp: , Rfl:     gabapentin (NEURONTIN) 300 MG capsule, Take 1 capsule by mouth As Needed. (Patient not taking: Reported on 9/5/2024), Disp: , Rfl:     History:   Past Medical History: " "  Diagnosis Date    Arthritis     Cataract     Chest pain     Coronary artery disease     Dyslipidemia     Fatty tumor     GERD (gastroesophageal reflux disease)     Hypertension     Inguinal hernia     Kidney stone     Low iron     Retinal detachment     right eye    SOB (shortness of breath)     Umbilical hernia     Unstable angina        Past Surgical History:   Procedure Laterality Date    CARDIAC CATHETERIZATION      CARDIAC CATHETERIZATION N/A 03/12/2019    Procedure: Left Heart Cath;  Surgeon: Curtis Wade MD;  Location: [facility] CATH INVASIVE LOCATION;  Service: Cardiology    CATARACT EXTRACTION Right     CORONARY STENT PLACEMENT      X 2    KNEE SURGERY Right 2019    joint replacement    UMBILICAL HERNIA REPAIR         Social History     Socioeconomic History    Marital status:     Number of children: 3   Tobacco Use    Smoking status: Never    Smokeless tobacco: Former     Types: Snuff     Quit date: 2020   Vaping Use    Vaping status: Never Used   Substance and Sexual Activity    Alcohol use: No    Drug use: No    Sexual activity: Defer        Family History   Problem Relation Age of Onset    Heart failure Mother     Coronary artery disease Father        Objective   Physical Exam:  Vitals:    09/05/24 1209 09/05/24 1210   BP: 144/84 132/82   BP Location: Left arm Right arm   Pulse: 73    Temp: 98.3 °F (36.8 °C)    SpO2: 98%    Weight: 118 kg (260 lb 4.8 oz)    Height: 175.3 cm (69\")  Comment: per pt       Body mass index is 38.44 kg/m².    Physical Exam  Vitals reviewed.   Constitutional:       General: He is not in acute distress.     Appearance: He is well-developed and well-groomed. He is obese. He is not toxic-appearing.   HENT:      Head: Normocephalic and atraumatic.   Eyes:      General: Lids are normal.      Conjunctiva/sclera: Conjunctivae normal.      Pupils: Pupils are equal, round, and reactive to light.   Neck:      Vascular: No carotid bruit or JVD.   Cardiovascular:      Rate " and Rhythm: Normal rate and regular rhythm.      Pulses:           Carotid pulses are 2+ on the right side and 2+ on the left side.       Radial pulses are 2+ on the right side and 2+ on the left side.      Heart sounds: S1 normal and S2 normal. No murmur heard.  Pulmonary:      Effort: Pulmonary effort is normal. No respiratory distress.      Breath sounds: Normal breath sounds.   Musculoskeletal:         General: Normal range of motion.      Cervical back: Normal range of motion and neck supple.   Skin:     General: Skin is warm and dry.      Capillary Refill: Capillary refill takes less than 2 seconds.   Neurological:      General: No focal deficit present.      Mental Status: He is alert and oriented to person, place, and time.      GCS: GCS eye subscore is 4. GCS verbal subscore is 5. GCS motor subscore is 6.   Psychiatric:         Attention and Perception: Attention normal.         Mood and Affect: Mood normal.         Speech: Speech normal.         Behavior: Behavior normal. Behavior is cooperative.         Cognition and Memory: Cognition normal.         Judgment: Judgment normal.     Imaging/Labs:  CT chest at [facility] 8/1/2024 (On personal review, TAA remains 5.0 cm)  Impression:  1. Mild aneurysm of the ascending thoracic aorta measuring 4.5 x 4.4 cm.  No change from prior study  2.  In the upper abdomen in the left anterior abdominal cavity is a 8 x 6.6 cm round fatty lesion partially visualized  3.  Recommend CT of the abdomen pelvis with IV contrast is this is not been previously evaluated    CT Chest With & Without Contrast (07/26/2023)   Impression: Stable dilatation ascending aorta at 5 cm    TTE 12/5/2022@ [facility]  Impression:  1.:  Left ventricle has normal cavity size, mild concentric hypertrophy, and normal systolic function.  The estimated LV ejection fraction is 60%  2.  The left atrium is dilated  3.  The right ventricle is normal in size and  function  4.  The right atrium is normal in size  5.  There is mild to moderate aortic regurgitation  6.  Mitral valve structure and function appear normal  7.  There is mild tricuspid regurgitation  8.  The pulmonic valve is normal  9.  There is no pericardial effusion  10.  There is dilation of the aortic root  11.  There is no evidence of intracardiac shunt     CT Angiogram Chest (07/11/2022)   1.  Negative for pulmonary embolism.  2.  Ascending aorta upper limits of normal in size no dissection is seen.  3.  Heavy coronary artery calcifications.  4.  There are 3 calcified granulomas in the right middle lobe.  5.  Negative for right heart strain.     CT Chest With & Without Contrast (04/09/2021)   Impression: Stable 5.1 cm aneurysmal dilatation of the ascending thoracic aorta.  Cardiomegaly.  Dilation of the central pulmonary artery consistent with pulmonary arterial hypertension.     CT Chest With & Without Contrast (01/31/2020)   Impression: Stable 5.1 cm ascending aortic aneurysm.  Other findings as noted.     US aaa screen limited (01/29/2019 13:07)   Impression: Abdominal aorta is within normal limits for size    Assessment / Plan      Assessment / Plan:  1. Aneurysm of ascending aorta without rupture  2. Family history of aneurysm  - 69 y.o. male non-smoker with history of HTN, HLD on statin therapy, a.fib on Eliquis, CAD s/p PCI 2019, and incidental 5cm ascending TAA being followed by Dr Evans since 2019.    - Positive familial history of aneurysmal disease and multiple siblings (4/6 siblings), one of which required surgical repair.   - Presents for annual surveillance: stable ascending aortic aneurysm 5.0 cm  - In the absence of valvular abnormality or significant interval growth this remains noninterventional until 5.5 cm or greater per Dr Evans's initial consult note  - Denies chest pain, upper back, or scapular pain.    - States BP checks at home are well controlled <130/80 mmHg.   - Current medical  "therapy includes ASA, Eliquis, Statin, ACE inhibitor and CCB.    - Continue to follow regularly w/ Cardiology  - TTE 12/2022 w/ mild to moderate aortic regurgitation.  Followed by Dr. Wade  - Re: CT incidental notation of \"abdominal fatty infiltrate\", contacted PCP.  Obtained prior CT abd/pelvis which demonstrated this lipoma in 2022. (Scanned into Epic)       Patient Education: Continue to maintain strict BP control w/ goal <130/80 mmHg.  Continue to avoid tobacco use.       Follow Up:   Return in about 1 year (around 9/5/2025).   Or sooner for any further concerns or worsening sign and symptoms. If unable to reach us in the office please dial 911 or go to the nearest emergency department.      WOLF Zhao  [facility] Cardiothoracic Surgery    Time Spent: I spent 35 minutes caring for Aristeo on this date of service. This time includes time spent by me in the following activities: preparing for the visit, reviewing tests, obtaining and/or reviewing a separately obtained history, performing a medically appropriate examination and/or evaluation, counseling and educating the patient/family/caregiver, documenting information in the medical record, independently interpreting results and communicating that information with the patient/family/caregiver, and care coordination.    "

## 2025-07-23 DIAGNOSIS — I71.20 THORACIC AORTIC ANEURYSM (TAA), UNSPECIFIED PART, UNSPECIFIED WHETHER RUPTURED: Primary | ICD-10-CM

## 2025-08-22 ENCOUNTER — HOSPITAL ENCOUNTER (OUTPATIENT)
Dept: CT IMAGING | Facility: HOSPITAL | Age: 70
Discharge: HOME | End: 2025-08-22
Payer: MEDICARE

## 2025-08-22 DIAGNOSIS — I71.20 THORACIC AORTIC ANEURYSM (TAA), UNSPECIFIED PART, UNSPECIFIED WHETHER RUPTURED: ICD-10-CM

## 2025-08-22 LAB — CREAT BLDA-MCNC: 0.8 MG/DL (ref 0.6–1.3)

## 2025-08-22 PROCEDURE — 82565 ASSAY OF CREATININE: CPT

## 2025-08-22 PROCEDURE — 71275 CT ANGIOGRAPHY CHEST: CPT

## 2025-08-22 PROCEDURE — 25510000001 IOPAMIDOL PER 1 ML: Performed by: NURSE PRACTITIONER

## 2025-08-22 RX ORDER — IOPAMIDOL 755 MG/ML
100 INJECTION, SOLUTION INTRAVASCULAR
Status: COMPLETED | OUTPATIENT
Start: 2025-08-22 | End: 2025-08-22

## 2025-08-22 RX ADMIN — IOPAMIDOL 80 ML: 755 INJECTION, SOLUTION INTRAVENOUS at 07:14

## (undated) DEVICE — CATH DIAG EXPO .045 FL3.5 5F 100CM

## (undated) DEVICE — DEV COMP RAD PRELUDESYNC 24CM

## (undated) DEVICE — GUIDE CATHETER: Brand: MACH1™

## (undated) DEVICE — CATH DIAG EXPO M/ PK 5F FL4/FR4 PIG

## (undated) DEVICE — CATH DIAG EXPO .045 FL4.5 5F 100CM

## (undated) DEVICE — MODEL AT P65, P/N 701554-001KIT CONTENTS: HAND CONTROLLER, 3-WAY HIGH-PRESSURE STOPCOCK WITH ROTATING END AND PREMIUM HIGH-PRESSURE TUBING: Brand: ANGIOTOUCH® KIT

## (undated) DEVICE — DEV INFL MONARCH 25W

## (undated) DEVICE — MODEL BT2000 P/N 700287-012KIT CONTENTS: MANIFOLD WITH SALINE AND CONTRAST PORTS, SALINE TUBING WITH SPIKE AND HAND SYRINGE, TRANSDUCER: Brand: BT2000 AUTOMATED MANIFOLD KIT

## (undated) DEVICE — CATH DIAG EXPO .045 FL3  5F 100CM

## (undated) DEVICE — GW INQWIRE FC PTFE STD J/1.5 .035 260

## (undated) DEVICE — INTRO SHEATH PRELUDE IDEAL SPRNG COIL 021 6F 23X80CM

## (undated) DEVICE — PK CATH CARD 10

## (undated) DEVICE — HI-TORQUE VERSATURN F GUIDE WIRE FULLY COATED .014 STRAIGHT TIP 190 CM: Brand: HI-TORQUE VERSATURN

## (undated) DEVICE — RADIFOCUS GLIDEWIRE: Brand: GLIDEWIRE